# Patient Record
Sex: FEMALE | Race: ASIAN | Employment: UNEMPLOYED | URBAN - METROPOLITAN AREA
[De-identification: names, ages, dates, MRNs, and addresses within clinical notes are randomized per-mention and may not be internally consistent; named-entity substitution may affect disease eponyms.]

---

## 2019-05-29 ENCOUNTER — HOSPITAL ENCOUNTER (INPATIENT)
Age: 74
LOS: 8 days | Discharge: NURSING, REHAB OR LONG-TERM CARE | DRG: 056 | End: 2019-06-06
Attending: EMERGENCY MEDICINE | Admitting: HOSPITALIST
Payer: COMMERCIAL

## 2019-05-29 ENCOUNTER — APPOINTMENT (OUTPATIENT)
Dept: MRI IMAGING | Age: 74
DRG: 056 | End: 2019-05-29
Attending: EMERGENCY MEDICINE
Payer: COMMERCIAL

## 2019-05-29 ENCOUNTER — APPOINTMENT (OUTPATIENT)
Dept: CT IMAGING | Age: 74
DRG: 056 | End: 2019-05-29
Attending: EMERGENCY MEDICINE
Payer: COMMERCIAL

## 2019-05-29 ENCOUNTER — APPOINTMENT (OUTPATIENT)
Dept: CT IMAGING | Age: 74
DRG: 056 | End: 2019-05-29
Attending: HOSPITALIST
Payer: COMMERCIAL

## 2019-05-29 ENCOUNTER — APPOINTMENT (OUTPATIENT)
Dept: NON INVASIVE DIAGNOSTICS | Age: 74
DRG: 056 | End: 2019-05-29
Attending: HOSPITALIST
Payer: COMMERCIAL

## 2019-05-29 DIAGNOSIS — G25.5 HEMIBALLISMUS: Primary | ICD-10-CM

## 2019-05-29 DIAGNOSIS — F03.90 DEMENTIA WITHOUT BEHAVIORAL DISTURBANCE, UNSPECIFIED DEMENTIA TYPE: ICD-10-CM

## 2019-05-29 DIAGNOSIS — R41.0 CONFUSION: ICD-10-CM
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
## 2019-05-29 PROBLEM — Z99.3 WHEELCHAIR BOUND: Status: ACTIVE | Noted: 2019-05-29
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
## 2019-05-29 PROBLEM — I10 HYPERTENSION: Status: ACTIVE | Noted: 2019-05-29

## 2019-05-29 PROBLEM — Z86.73 HISTORY OF STROKE: Status: ACTIVE | Noted: 2019-05-29

## 2019-05-29 LAB
ALBUMIN SERPL-MCNC: 3.6 G/DL (ref 3.4–5)
ALBUMIN/GLOB SERPL: 0.8 {RATIO} (ref 0.8–1.7)
ALP SERPL-CCNC: 85 U/L (ref 45–117)
ALT SERPL-CCNC: 34 U/L (ref 13–56)
AMMONIA PLAS-SCNC: 20 UMOL/L (ref 11–32)
ANION GAP SERPL CALC-SCNC: 8 MMOL/L (ref 3–18)
APPEARANCE UR: CLEAR
APTT PPP: 26.7 SEC (ref 23–36.4)
AST SERPL-CCNC: 27 U/L (ref 15–37)
BACTERIA URNS QL MICRO: ABNORMAL /HPF
BASOPHILS # BLD: 0 K/UL (ref 0–0.1)
BASOPHILS NFR BLD: 0 % (ref 0–2)
BILIRUB SERPL-MCNC: 1 MG/DL (ref 0.2–1)
BILIRUB UR QL: NEGATIVE
BUN SERPL-MCNC: 32 MG/DL (ref 7–18)
BUN/CREAT SERPL: 27 (ref 12–20)
CALCIUM SERPL-MCNC: 8.8 MG/DL (ref 8.5–10.1)
CHLORIDE SERPL-SCNC: 109 MMOL/L (ref 100–108)
CK MB CFR SERPL CALC: 1.3 % (ref 0–4)
CK MB SERPL-MCNC: 3.4 NG/ML (ref 5–25)
CK SERPL-CCNC: 260 U/L (ref 26–192)
CO2 SERPL-SCNC: 26 MMOL/L (ref 21–32)
COLOR UR: YELLOW
CREAT SERPL-MCNC: 1.18 MG/DL (ref 0.6–1.3)
DIFFERENTIAL METHOD BLD: ABNORMAL
EOSINOPHIL # BLD: 1.1 K/UL (ref 0–0.4)
EOSINOPHIL NFR BLD: 12 % (ref 0–5)
EPITH CASTS URNS QL MICRO: ABNORMAL /LPF (ref 0–5)
ERYTHROCYTE [DISTWIDTH] IN BLOOD BY AUTOMATED COUNT: 13.4 % (ref 11.6–14.5)
GLOBULIN SER CALC-MCNC: 4.4 G/DL (ref 2–4)
GLUCOSE BLD STRIP.AUTO-MCNC: 98 MG/DL (ref 70–110)
GLUCOSE SERPL-MCNC: 106 MG/DL (ref 74–99)
GLUCOSE UR STRIP.AUTO-MCNC: NEGATIVE MG/DL
HCT VFR BLD AUTO: 34.8 % (ref 35–45)
HGB BLD-MCNC: 11.2 G/DL (ref 12–16)
HGB UR QL STRIP: NEGATIVE
INR PPP: 1 (ref 0.8–1.2)
KETONES UR QL STRIP.AUTO: NEGATIVE MG/DL
LEUKOCYTE ESTERASE UR QL STRIP.AUTO: NEGATIVE
LYMPHOCYTES # BLD: 1.8 K/UL (ref 0.9–3.6)
LYMPHOCYTES NFR BLD: 20 % (ref 21–52)
MAGNESIUM SERPL-MCNC: 2.3 MG/DL (ref 1.6–2.6)
MCH RBC QN AUTO: 29.7 PG (ref 24–34)
MCHC RBC AUTO-ENTMCNC: 32.2 G/DL (ref 31–37)
MCV RBC AUTO: 92.3 FL (ref 74–97)
MONOCYTES # BLD: 0.6 K/UL (ref 0.05–1.2)
MONOCYTES NFR BLD: 7 % (ref 3–10)
NEUTS SEG # BLD: 5.6 K/UL (ref 1.8–8)
NEUTS SEG NFR BLD: 61 % (ref 40–73)
NITRITE UR QL STRIP.AUTO: NEGATIVE
PH UR STRIP: 5.5 [PH] (ref 5–8)
PLATELET # BLD AUTO: 290 K/UL (ref 135–420)
PMV BLD AUTO: 9.8 FL (ref 9.2–11.8)
POTASSIUM SERPL-SCNC: 3.7 MMOL/L (ref 3.5–5.5)
PROT SERPL-MCNC: 8 G/DL (ref 6.4–8.2)
PROT UR STRIP-MCNC: 100 MG/DL
PROTHROMBIN TIME: 12.6 SEC (ref 11.5–15.2)
RBC # BLD AUTO: 3.77 M/UL (ref 4.2–5.3)
RBC #/AREA URNS HPF: ABNORMAL /HPF (ref 0–5)
SODIUM SERPL-SCNC: 143 MMOL/L (ref 136–145)
SP GR UR REFRACTOMETRY: 1.02 (ref 1–1.03)
TROPONIN I SERPL-MCNC: <0.02 NG/ML (ref 0–0.04)
UROBILINOGEN UR QL STRIP.AUTO: 1 EU/DL (ref 0.2–1)
WBC # BLD AUTO: 9.1 K/UL (ref 4.6–13.2)
WBC URNS QL MICRO: ABNORMAL /HPF (ref 0–5)

## 2019-05-29 PROCEDURE — 74011636320 HC RX REV CODE- 636/320: Performed by: EMERGENCY MEDICINE

## 2019-05-29 PROCEDURE — 77030005563 HC CATH URETH INT MMGH -A

## 2019-05-29 PROCEDURE — 81001 URINALYSIS AUTO W/SCOPE: CPT

## 2019-05-29 PROCEDURE — 93005 ELECTROCARDIOGRAM TRACING: CPT

## 2019-05-29 PROCEDURE — 83735 ASSAY OF MAGNESIUM: CPT

## 2019-05-29 PROCEDURE — 70496 CT ANGIOGRAPHY HEAD: CPT

## 2019-05-29 PROCEDURE — 99285 EMERGENCY DEPT VISIT HI MDM: CPT

## 2019-05-29 PROCEDURE — 82962 GLUCOSE BLOOD TEST: CPT

## 2019-05-29 PROCEDURE — 95816 EEG AWAKE AND DROWSY: CPT | Performed by: HOSPITALIST

## 2019-05-29 PROCEDURE — 82550 ASSAY OF CK (CPK): CPT

## 2019-05-29 PROCEDURE — 85610 PROTHROMBIN TIME: CPT

## 2019-05-29 PROCEDURE — 74011250636 HC RX REV CODE- 250/636: Performed by: EMERGENCY MEDICINE

## 2019-05-29 PROCEDURE — 70551 MRI BRAIN STEM W/O DYE: CPT

## 2019-05-29 PROCEDURE — 65660000000 HC RM CCU STEPDOWN

## 2019-05-29 PROCEDURE — 85025 COMPLETE CBC W/AUTO DIFF WBC: CPT

## 2019-05-29 PROCEDURE — 70450 CT HEAD/BRAIN W/O DYE: CPT

## 2019-05-29 PROCEDURE — 82140 ASSAY OF AMMONIA: CPT

## 2019-05-29 PROCEDURE — 85730 THROMBOPLASTIN TIME PARTIAL: CPT

## 2019-05-29 PROCEDURE — 74011250636 HC RX REV CODE- 250/636: Performed by: HOSPITALIST

## 2019-05-29 PROCEDURE — 80053 COMPREHEN METABOLIC PANEL: CPT

## 2019-05-29 PROCEDURE — 51701 INSERT BLADDER CATHETER: CPT

## 2019-05-29 RX ORDER — LORAZEPAM 2 MG/ML
1 INJECTION INTRAMUSCULAR ONCE
Status: COMPLETED | OUTPATIENT
Start: 2019-05-29 | End: 2019-05-29

## 2019-05-29 RX ORDER — SODIUM CHLORIDE 9 MG/ML
30 INJECTION INTRAMUSCULAR; INTRAVENOUS; SUBCUTANEOUS ONCE
Status: DISPENSED | OUTPATIENT
Start: 2019-05-29 | End: 2019-05-30

## 2019-05-29 RX ORDER — ERGOCALCIFEROL 1.25 MG/1
50000 CAPSULE ORAL
COMMUNITY

## 2019-05-29 RX ORDER — ATORVASTATIN CALCIUM 20 MG/1
40 TABLET, FILM COATED ORAL DAILY
Status: DISCONTINUED | OUTPATIENT
Start: 2019-05-29 | End: 2019-05-30

## 2019-05-29 RX ORDER — ENOXAPARIN SODIUM 100 MG/ML
40 INJECTION SUBCUTANEOUS EVERY 24 HOURS
Status: DISCONTINUED | OUTPATIENT
Start: 2019-05-29 | End: 2019-06-06 | Stop reason: HOSPADM

## 2019-05-29 RX ORDER — SODIUM CHLORIDE 9 MG/ML
INJECTION INTRAMUSCULAR; INTRAVENOUS; SUBCUTANEOUS
Status: DISPENSED
Start: 2019-05-29 | End: 2019-05-30

## 2019-05-29 RX ORDER — SODIUM CHLORIDE 9 MG/ML
75 INJECTION, SOLUTION INTRAVENOUS CONTINUOUS
Status: DISPENSED | OUTPATIENT
Start: 2019-05-29 | End: 2019-05-30

## 2019-05-29 RX ORDER — ACETAMINOPHEN 325 MG/1
650 TABLET ORAL
Status: DISCONTINUED | OUTPATIENT
Start: 2019-05-29 | End: 2019-06-06 | Stop reason: HOSPADM

## 2019-05-29 RX ORDER — SODIUM CHLORIDE 0.9 % (FLUSH) 0.9 %
5-40 SYRINGE (ML) INJECTION EVERY 8 HOURS
Status: DISCONTINUED | OUTPATIENT
Start: 2019-05-29 | End: 2019-06-06 | Stop reason: HOSPADM

## 2019-05-29 RX ORDER — GUAIFENESIN 100 MG/5ML
81 LIQUID (ML) ORAL DAILY
Status: DISCONTINUED | OUTPATIENT
Start: 2019-05-30 | End: 2019-06-06 | Stop reason: HOSPADM

## 2019-05-29 RX ORDER — ONDANSETRON 2 MG/ML
4 INJECTION INTRAMUSCULAR; INTRAVENOUS
Status: DISCONTINUED | OUTPATIENT
Start: 2019-05-29 | End: 2019-06-06 | Stop reason: HOSPADM

## 2019-05-29 RX ORDER — GUAIFENESIN 100 MG/5ML
81 LIQUID (ML) ORAL
Status: DISPENSED | OUTPATIENT
Start: 2019-05-29 | End: 2019-05-30

## 2019-05-29 RX ORDER — ATORVASTATIN CALCIUM 20 MG/1
20 TABLET, FILM COATED ORAL DAILY
COMMUNITY

## 2019-05-29 RX ORDER — SODIUM CHLORIDE 0.9 % (FLUSH) 0.9 %
5-40 SYRINGE (ML) INJECTION AS NEEDED
Status: DISCONTINUED | OUTPATIENT
Start: 2019-05-29 | End: 2019-06-06 | Stop reason: HOSPADM

## 2019-05-29 RX ORDER — ATORVASTATIN CALCIUM 20 MG/1
20 TABLET, FILM COATED ORAL DAILY
Status: DISCONTINUED | OUTPATIENT
Start: 2019-05-30 | End: 2019-06-06 | Stop reason: HOSPADM

## 2019-05-29 RX ORDER — LOSARTAN POTASSIUM 50 MG/1
50 TABLET ORAL DAILY
COMMUNITY

## 2019-05-29 RX ADMIN — ENOXAPARIN SODIUM 40 MG: 40 INJECTION SUBCUTANEOUS at 14:50

## 2019-05-29 RX ADMIN — Medication 10 ML: at 21:19

## 2019-05-29 RX ADMIN — SODIUM CHLORIDE 100 ML/HR: 900 INJECTION, SOLUTION INTRAVENOUS at 15:52

## 2019-05-29 RX ADMIN — Medication 10 ML: at 15:52

## 2019-05-29 RX ADMIN — LORAZEPAM 1 MG: 2 INJECTION, SOLUTION INTRAMUSCULAR; INTRAVENOUS at 13:59

## 2019-05-29 RX ADMIN — IOPAMIDOL 100 ML: 755 INJECTION, SOLUTION INTRAVENOUS at 14:26

## 2019-05-29 NOTE — PROGRESS NOTES
TRANSFER - IN REPORT:    Verbal report received from [place] RN(name) on [place]  being received from ED(unit) for routine progression of care      Report consisted of patients Situation, Background, Assessment and   Recommendations(SBAR). Information from the following report(s) SBAR, Kardex, ED Summary, Intake/Output, MAR, Accordion and Alarm Parameters  was reviewed with the receiving nurse. Opportunity for questions and clarification was provided. Assessment completed upon patients arrival to unit and care assumed. 1545 Patient received 1 mg of Ativan downstairs. Patient extremely drowsy. Attempted to perform neuro assessment, Pt wakes up then falls right back to sleep. 36 Dr. Claire Cloud aware of patient's level of consciousness. Told to keep her NPO until she wakes up. Shift Summary: Shift uneventful. No complaints of chest pain or shortness of breath. Call light is within reach.

## 2019-05-29 NOTE — ED PROVIDER NOTES
EMERGENCY DEPARTMENT HISTORY AND PHYSICAL EXAM    Date: 5/29/2019  Patient Name: Kirk Chowdary    History of Presenting Illness     Chief Complaint   Patient presents with    Altered mental status         History Provided By: Patient's daughter    Additional History (Context):   Kirk Chowdary is a 69 y.o. female with PMHX Hypertension, hyperlipidemia who is a non-English speaker presents to the emergency department with her daughter who is translating with reported altered mental status. Patient is wheelchair-bound secondary to previous right lower extremity injury. Patient's daughter reports that the patient fell from her wheelchair 3 days ago and since that time has had uncontrolled movements of her right upper extremity and appearing more confused. Her daughter reports that she is unsure if the patient hit her head however was found down on the ground. .  Pt denies chest pain, shortness of breath, abdominal pain, headache and any other sxs or complaints. Daughter denies any recent illness. Daughter reports that the patient has been confused which is not her baseline. PCP: None        Past History     Past Medical History:  Past Medical History:   Diagnosis Date    Hyperlipemia     Stroke [facility])        Past Surgical History:  Past Surgical History:   Procedure Laterality Date    HX KNEE ARTHROSCOPY      right hip       Family History:  History reviewed. No pertinent family history. Social History:  Social History     Tobacco Use    Smoking status: Never Smoker    Smokeless tobacco: Never Used   Substance Use Topics    Alcohol use: Not Currently    Drug use: Not on file       Allergies:  No Known Allergies      Review of Systems   Review of Systems   Constitutional: Negative for chills and fever. HENT: Negative for congestion, ear pain, sinus pain and sore throat. Eyes: Negative for pain and visual disturbance. Respiratory: Negative for cough and shortness of breath.     Cardiovascular: Negative for chest pain and leg swelling. Gastrointestinal: Negative for abdominal pain, constipation, diarrhea, nausea and vomiting. Genitourinary: Negative for dysuria, hematuria, vaginal bleeding and vaginal discharge. Musculoskeletal: Negative for back pain and neck pain. Skin: Negative for rash and wound. Neurological: Negative for dizziness, tremors, weakness, light-headedness and numbness. Psychiatric/Behavioral: Positive for confusion. All other systems reviewed and are negative.       Physical Exam     Vitals:    05/29/19 1133 05/29/19 1215 05/29/19 1230 05/29/19 1248   BP: 144/75 129/60 125/57 135/62   Pulse: 90 78 79 79   Resp: 18 19 18 14   Temp: 97.8 °F (36.6 °C)      SpO2: 97% 100% 100% 100%   Weight: 63.5 kg (140 lb)      Height: 5' 2\" (1.575 m)        Physical Exam    Nursing note and vitals reviewed    Constitutional: Elderly  female, awake and alert however appearing confused, able to be redirectable  Head: Normocephalic, Atraumatic  Eyes: Pupils are equal, round, and reactive to light, EOMI  Neck: Supple, non-tender  Cardiovascular: Regular rate and rhythm, no murmurs, rubs, or gallops  Chest: Normal work of breathing and chest excursion bilaterally  Lungs: Clear to ausculation bilaterally, no wheezes, no rhonchi  Abdomen: Soft, non tender, non distended, normoactive bowel sounds  Back: No evidence of trauma or deformity  Extremities: No evidence of trauma or deformity, no LE edema  Skin: Warm and dry, normal cap refill  Neuro: Awake and alert, no facial droop, no slurred speech, right upper extremity with intermittent hemiballismus, right lower extremity with drift, at her baseline, left upper and left lower extremity moving symmetrically and volitionally         Diagnostic Study Results     Labs -     Recent Results (from the past 12 hour(s))   CBC WITH AUTOMATED DIFF    Collection Time: 05/29/19 11:50 AM   Result Value Ref Range    WBC 9.1 4.6 - 13.2 K/uL    RBC 3.77 (L) 4.20 - 5.30 M/uL    HGB 11.2 (L) 12.0 - 16.0 g/dL    HCT 34.8 (L) 35.0 - 45.0 %    MCV 92.3 74.0 - 97.0 FL    MCH 29.7 24.0 - 34.0 PG    MCHC 32.2 31.0 - 37.0 g/dL    RDW 13.4 11.6 - 14.5 %    PLATELET 992 511 - 356 K/uL    MPV 9.8 9.2 - 11.8 FL    NEUTROPHILS 61 40 - 73 %    LYMPHOCYTES 20 (L) 21 - 52 %    MONOCYTES 7 3 - 10 %    EOSINOPHILS 12 (H) 0 - 5 %    BASOPHILS 0 0 - 2 %    ABS. NEUTROPHILS 5.6 1.8 - 8.0 K/UL    ABS. LYMPHOCYTES 1.8 0.9 - 3.6 K/UL    ABS. MONOCYTES 0.6 0.05 - 1.2 K/UL    ABS. EOSINOPHILS 1.1 (H) 0.0 - 0.4 K/UL    ABS. BASOPHILS 0.0 0.0 - 0.1 K/UL    DF AUTOMATED     AMMONIA    Collection Time: 05/29/19 11:50 AM   Result Value Ref Range    Ammonia 20 11 - 32 UMOL/L   METABOLIC PANEL, COMPREHENSIVE    Collection Time: 05/29/19 11:50 AM   Result Value Ref Range    Sodium 143 136 - 145 mmol/L    Potassium 3.7 3.5 - 5.5 mmol/L    Chloride 109 (H) 100 - 108 mmol/L    CO2 26 21 - 32 mmol/L    Anion gap 8 3.0 - 18 mmol/L    Glucose 106 (H) 74 - 99 mg/dL    BUN 32 (H) 7.0 - 18 MG/DL    Creatinine 1.18 0.6 - 1.3 MG/DL    BUN/Creatinine ratio 27 (H) 12 - 20      GFR est AA 55 (L) >60 ml/min/1.73m2    GFR est non-AA 45 (L) >60 ml/min/1.73m2    Calcium 8.8 8.5 - 10.1 MG/DL    Bilirubin, total 1.0 0.2 - 1.0 MG/DL    ALT (SGPT) 34 13 - 56 U/L    AST (SGOT) 27 15 - 37 U/L    Alk.  phosphatase 85 45 - 117 U/L    Protein, total 8.0 6.4 - 8.2 g/dL    Albumin 3.6 3.4 - 5.0 g/dL    Globulin 4.4 (H) 2.0 - 4.0 g/dL    A-G Ratio 0.8 0.8 - 1.7     MAGNESIUM    Collection Time: 05/29/19 11:50 AM   Result Value Ref Range    Magnesium 2.3 1.6 - 2.6 mg/dL   CARDIAC PANEL,(CK, CKMB & TROPONIN)    Collection Time: 05/29/19 11:50 AM   Result Value Ref Range     (H) 26 - 192 U/L    CK - MB 3.4 <3.6 ng/ml    CK-MB Index 1.3 0.0 - 4.0 %    Troponin-I, QT <0.02 0.0 - 0.045 NG/ML   PROTHROMBIN TIME + INR    Collection Time: 05/29/19 11:50 AM   Result Value Ref Range    Prothrombin time 12.6 11.5 - 15.2 sec    INR 1.0 0.8 - 1.2 PTT    Collection Time: 05/29/19 11:50 AM   Result Value Ref Range    aPTT 26.7 23.0 - 36.4 SEC   EKG, 12 LEAD, INITIAL    Collection Time: 05/29/19 11:50 AM   Result Value Ref Range    Ventricular Rate 86 BPM    Atrial Rate 86 BPM    P-R Interval 148 ms    QRS Duration 126 ms    Q-T Interval 412 ms    QTC Calculation (Bezet) 493 ms    Calculated P Axis 45 degrees    Calculated R Axis 36 degrees    Calculated T Axis 35 degrees    Diagnosis       Normal sinus rhythm  Right bundle branch block  Abnormal ECG  No previous ECGs available     URINALYSIS W/ RFLX MICROSCOPIC    Collection Time: 05/29/19 11:57 AM   Result Value Ref Range    Color YELLOW      Appearance CLEAR      Specific gravity 1.025 1.005 - 1.030      pH (UA) 5.5 5.0 - 8.0      Protein 100 (A) NEG mg/dL    Glucose NEGATIVE  NEG mg/dL    Ketone NEGATIVE  NEG mg/dL    Bilirubin NEGATIVE  NEG      Blood NEGATIVE  NEG      Urobilinogen 1.0 0.2 - 1.0 EU/dL    Nitrites NEGATIVE  NEG      Leukocyte Esterase NEGATIVE  NEG         Radiologic Studies -   CT HEAD WO CONT   Final Result   IMPRESSION:         1. No acute intracranial abnormality. 2. Evidence of prior left frontal lobe infarcts with associated ex vacuo   dilatation of the anterior horn of the left lateral ventricle. 3. Subcortical and periventricular white matter low-attenuation; favored to   reflect sequela of chronic ischemic microvascular change. MRI BRAIN WO CONT    (Results Pending)     CT Results  (Last 48 hours)               05/29/19 1215  CT HEAD WO CONT Final result    Impression:  IMPRESSION:           1. No acute intracranial abnormality. 2. Evidence of prior left frontal lobe infarcts with associated ex vacuo   dilatation of the anterior horn of the left lateral ventricle. 3. Subcortical and periventricular white matter low-attenuation; favored to   reflect sequela of chronic ischemic microvascular change.        Narrative:  EXAM: CT head       INDICATION: Unwitnessed fall, increasing agitation. COMPARISON: None. TECHNIQUE: Axial CT imaging of the head was performed without intravenous   contrast.       One or more dose reduction techniques were used on this CT: automated exposure   control, adjustment of the mAs and/or kVp according to patient size, and   iterative reconstruction techniques. The specific techniques used on this CT   exam have been documented in the patient's electronic medical record. Digital   Imaging and Communications in Medicine (DICOM) format image data are available   to nonaffiliated external healthcare facilities or entities on a secure, media   free, reciprocally searchable basis with patient authorization for at least a   12-month period after this study. _______________       FINDINGS:       BRAIN AND POSTERIOR FOSSA: There is mild cortical and cerebellar volume loss   present. There is ex vacuo dilatation of the left lateral ventricle related to   adjacent areas of encephalomalacia within the left frontal lobe and left centrum   semiovale basilar cisterns are patent. Subcortical and periventricular white   matter low-attenuation is present. Bilateral physiologic basal ganglia   calcifications. There is no intracranial hemorrhage, mass effect, or midline   shift. Gray-white matter differentiation is within normal limits. EXTRA-AXIAL SPACES AND MENINGES: There are no abnormal extra-axial fluid   collections. CALVARIUM: Intact. SINUSES: Imaged paranasal sinuses and mastoid air cells are clear. OTHER: None.       _______________               CXR Results  (Last 48 hours)    None            Medical Decision Making   I am the first provider for this patient. I reviewed the vital signs, available nursing notes, past medical history, past surgical history, family history and social history. Vital Signs-Reviewed the patient's vital signs.     Pulse Oximetry Analysis -97 % on room air    Cardiac Monitor:  Rate: 90 bpm  Rhythm: Regular    EKG interpretation: (Preliminary)  11:53 AM   Normal sinus rhythm at 86 bpm.  QTc 493 ms. Right bundle branch block, no prior EKGs to compare. No acute ST elevation    Records Reviewed: Nursing Notes and Old Medical Records    Provider Notes:   69 y.o. female with a history of hypertension, hyperlipidemia presenting with uncontrolled movements of her right upper extremity and confusion after a fall 4 days ago. Presentation patient is awake, alert. Afebrile with appropriate vital signs. Although she is redirectable she does appear confused. No slurred speech and no facial droop. Has uncontrolled hemiballismus of her right upper extremity. Concern for acute bleed versus stroke. However patient symptoms occurred 4 days ago, not within the TPA window. No indication for stroke alert. Will obtain labs to evaluate for her altered mental status including ammonia, UA. Procedures:  Procedures    ED Course:   11:33 AM   Initial assessment performed. The patients presenting problems have been discussed, and they are in agreement with the care plan formulated and outlined with them. I have encouraged them to ask questions as they arise throughout their visit. ED Course as of May 29 1323   Wed May 29, 2019   1300 1:18 PM CT scan showing no acute intracranial hemorrhage. Old left frontal infarct. Discussed patient's history, exam, and available diagnostics results with Teresita Do MD neurology, who agree with MRI, admission for EEG. [CA]      ED Course User Index  [CA] Tucker Montenegro,        Diagnosis and Disposition     1:21 PM  I have spent 35 minutes of critical care time involved in lab review, consultations with specialist, family decision-making, and documentation. During this entire length of time I was immediately available to the patient. Critical Care:   The reason for providing this level of medical care for this critically ill patient was due a critical illness that impaired one or more vital organ systems such that there was a high probability of imminent or life threatening deterioration in the patients condition. This care involved high complexity decision making to assess, manipulate, and support vital system functions, to treat this degreee vital organ system failure and to prevent further life threatening deterioration of the patients condition. Core Measures:  For Hospitalized Patients:    1. Hospitalization Decision Time:  The decision to hospitalize the patient was made by Anjana Montenegro DO at 1:22 PM on 5/29/2019    2. Aspirin: Aspirin was given    1:21 PM  Patient is being admitted to the hospital by Emil Chinchilla MD. The results of their tests and reasons for their admission have been discussed with them and/or available family. They convey agreement and understanding for the need to be admitted and for their admission diagnosis. CONDITIONS ON ADMISSION:  Sepsis is not present at the time of admission. Urinary Tract Infection is not present at the time of admission. MRSA is not present at the time of admission. Wound infection is not present at the time of admission. Pressure Ulcer is not present at the time of admission. CLINICAL IMPRESSION:    1. Hemiballismus    2. Confusion      ____________________________________     Please note that this dictation was completed with [employer], the computer voice recognition software. Quite often unanticipated grammatical, syntax, homophones, and other interpretive errors are inadvertently transcribed by the computer software. Please disregard these errors. Please excuse any errors that have escaped final proofreading.

## 2019-05-29 NOTE — PROGRESS NOTES
1930 - Bedside and Verbal shift change report given to Angelo Lorenzo RN and Edie Quispe RN (oncoming nurse) by Pepe Negrete RN (offgoing nurse).  Report included the following information SBAR, Kardex, ED Summary, Intake/Output, Recent Results and Cardiac Rhythm SR. NS at 100ml/hr

## 2019-05-29 NOTE — ED NOTES
TRANSFER - OUT REPORT:    Verbal report given to Nicolette(name) calderon Miller Born  being transferred to [facility](unit) for routine progression of care       Report consisted of patients Situation, Background, Assessment and   Recommendations(SBAR). Information from the following report(s) SBAR, Kardex, ED Summary, Intake/Output, MAR, Recent Results and Cardiac Rhythm NSR  was reviewed with the receiving nurse. Lines:   Peripheral IV 05/29/19 Left Antecubital (Active)   Site Assessment Clean, dry, & intact 5/29/2019 12:00 PM   Phlebitis Assessment 0 5/29/2019 12:00 PM   Infiltration Assessment 0 5/29/2019 12:00 PM   Dressing Status Clean, dry, & intact 5/29/2019 12:00 PM   Dressing Type Transparent;Tape 5/29/2019 12:00 PM   Hub Color/Line Status Pink 5/29/2019 12:00 PM   Action Taken Blood drawn 5/29/2019 12:00 PM   Alcohol Cap Used Yes 5/29/2019 12:00 PM        Opportunity for questions and clarification was provided.       Patient transported with:   Monitor  Registered Nurse

## 2019-05-29 NOTE — PROGRESS NOTES
Bedside shift change report given to Toney Candelaria RN (oncoming nurse) by Lindsey Dougherty RN (offgoing nurse). Report included the following information SBAR, Kardex, ED Summary, Intake/Output, MAR, Accordion, Recent Results and Alarm Parameters .

## 2019-05-29 NOTE — ED NOTES
RN in room for purpose of hourly rounding. Room checked for trash and safety hazards. Patient is. ..    [  ] seated at bedside. [  ] lying in bed with side rails up and call bell in reach. [ x ] lying in with call bell in reach and continuous monitoring in place. Pain reduction interventions. ..    [  ] not indicated at this time      include the following   [  ] administration of analgesic medications   [  ] lights turned down   [  ] patient offered a cool washcloth   [  ] heat applied   [  ] ice applied   [ x ] patient repositioned    Restroom needs addressed. Patient is. ... [ x ] Not in need restroom assistance at this time  [  ] Ambulatory as needed to the restroom  [  ] Assisted to bedside commode  [  ] Assisted with use of bed pan  [  ] Provided with a urinal    Position. ..    [  ] Patient does not require assistance with repositioning  [ x ] Patient repositions with assistance of RN  [  ] Patient repositioned with assistance of RN and other ED staff.

## 2019-05-29 NOTE — ED TRIAGE NOTES
Patient had unwitnessed fall on Sunday per daughter, patient's daughter states that she started acting different yesterday, patient is alert, patient has repetitive   right arm movement that is not normal per patient's daughter, patient in wheelchair at baseline per daughter

## 2019-05-29 NOTE — H&P
[facility] [place]  HISTORY AND PHYSICAL    Name:  Fernando Encinas  MR#:   [id number]  :  1945  ACCOUNT #:  [de-identified]  ADMIT DATE:  2019    ADMITTING DIAGNOSES:  1. Fall at home. 2.  Hemiballismus. 3.  Possible stroke. 4.  History of stroke. 5.  Hypertension. 6.  Wheelchair dependence due to prior hip injury. HISTORY OF PRESENT ILLNESS:  This is a 79-year-old [place] female with a past medical history of high blood pressure and hyperlipidemia who does not speak Ollis fabroomskler. She came into the emergency room with her daughter because she had fallen from her wheelchair about 3 days ago. The day after the fall, the daughter noted that she could not feed herself as usual and had uncontrolled movement of her right arm and was also more confused. Her daughter thought it may clear up but she did not, and with that, she brought her into the ER today. She apparently had hit her head per the patient's report to her daughter when she fell from the wheelchair, but her daughter was not present when the fall happened. She found her down on the ground. There was no noted loss of consciousness or incontinence. She does not have a history of seizures. In the emergency room, she was sent for MRI and CTA of the head and neck. Neurology has been consulted from the ER, and I visited with her daughter in the CT area and examined the patient. Unfortunately, the patient is unable to provide any history or participate in her exam as she has been given Ativan to have cooperation for the MRI. PAST MEDICAL HISTORY:  Hyperlipidemia, hypertension, stroke, left hip surgery, left heel ulcer that previously required a wound VAC. PAST SURGICAL HISTORY:  Other surgeries to include heel surgery, knee surgery. ALLERGIES:  SHE HAS NO MEDICATION ALLERGIES. CURRENT MEDICATIONS:  At home, she is on Cozaar, vitamin D, and Lipitor. SOCIAL HISTORY:  Nonsmoker. No alcohol or drug use.   She lives with her daughter. She is wheelchair dependent. FAMILY HISTORY:  High blood pressure. Her PCP is in [place], name is not listed. REVIEW OF SYSTEMS:  Not able to be obtained from the patient currently because of sedation from Ativan. From what the daughter notes to me  CONSTITUTIONAL:  There has been no fevers noted at home. RESPIRATORY:  No shortness of breath or coughing. CARDIOVASCULAR:  She has had no complaints of chest pain or leg swelling. GASTROINTESTINAL:  She has had no change in her appetite but inability to feed herself. No abdominal pain, nausea, vomiting, or diarrhea. GENITOURINARY:  No urinary incontinence. NEUROLOGIC:  She has had spastic movements of the right arm, uncontrolled movements of the right arm, inability to coordinate the right arm, inability to feed herself, and more confused compared baseline. PHYSICAL EXAMINATION:  GENERAL:  This is a currently sedated  female who appears elderly. VITAL SIGNS:  Her blood pressure is 126/61, pulse 78, temperature 97.7, respiratory rate is 15, and SaO2 is 99%. She is again sedated from Ativan. CARDIOVASCULAR:  Regular rate and rhythm. No murmur, rub, or gallop. CHEST:  Normal work of breathing. Bilaterally clear. ABDOMEN:  Soft, nontender. No distention. Normoactive bowel sounds. LOWER EXTREMITIES:  There is no clubbing, cyanosis, or edema. SKIN:  Appears warm, dry. No lesions or rash noted. NEUROLOGIC:  She is sedated. She is not following commands currently. From the emergency room, there was noted intermittent hemiballismus with right lower extremity drift, and the left upper extremity and lower extremity had been moving symmetrically and volitionally there per their report. LABORATORY DATA:  A CT of her head was done. It showed no acute intracranial abnormality but there is evidence of prior left frontal lobe infarcts with ex vacuo dilatation of the anterior horn of the left lateral ventricle.   She had various labs done in the emergency room which included an EKG that showed right bundle-branch block, normal sinus rhythm. Urinalysis with 1-3 wbc's, 2+ bacteria. A CTA head and neck has been performed. The results are pending. MRI of the brain has been performed, results are pending. White count is 9.1, H and H 11.2 and 34.8, platelets are 047. INR is 1.0, glucose 106. ASSESSMENT:  1. Fall at home from the wheelchair. 2.  Right hemiballismus. 3.  Possible stroke. 4.  History of stroke. 5.  Hypertension. 6.  History of left hip surgery leaving her wheelchair dependent. 7.  Nonambulatory status. PLAN:  Admission to the telemetry unit. IV fluid hydration and liberal hypertension. Follow up the results of the MRI of the brain and CTA of the head and neck as well as an echocardiogram.  Tomorrow's labs, CBC, lipid panel, CMP. Consult with Neurology. Continue fluid hydration. Continue aspirin daily, Lovenox for DVT prophylaxis. Hold her blood pressure medicine for now and see what things look like. I am also planning an EEG for her to make sure that this is not spastic-type movement from seizures. She will need a bedside swallow evaluation to start on a cardiac diet. We are going to follow her blood sugars a.c. and at bedtime. Consult with PT and OT. I have discussed the plan of care with her daughter who is at the bedside. I am expecting 48-72 hours in the hospital with possible SNF placement after discharge from here.       Valery Llanes MD      [id number]  D:  05/29/2019 15:11  T:  05/29/2019 15:18  JOB #:  [id number]

## 2019-05-29 NOTE — PROGRESS NOTES
Problem: Pressure Injury - Risk of  Goal: *Prevention of pressure injury  Description  Document Theodore Scale and appropriate interventions in the flowsheet.   Outcome: Progressing Towards Goal     Problem: Patient Education: Go to Patient Education Activity  Goal: Patient/Family Education  Outcome: Progressing Towards Goal

## 2019-05-30 ENCOUNTER — APPOINTMENT (OUTPATIENT)
Dept: NON INVASIVE DIAGNOSTICS | Age: 74
DRG: 056 | End: 2019-05-30
Attending: HOSPITALIST
Payer: COMMERCIAL

## 2019-05-30 LAB
ALBUMIN SERPL-MCNC: 3.2 G/DL (ref 3.4–5)
ALBUMIN/GLOB SERPL: 0.8 {RATIO} (ref 0.8–1.7)
ALP SERPL-CCNC: 75 U/L (ref 45–117)
ALT SERPL-CCNC: 29 U/L (ref 13–56)
ANION GAP SERPL CALC-SCNC: 9 MMOL/L (ref 3–18)
AST SERPL-CCNC: 24 U/L (ref 15–37)
ATRIAL RATE: 86 BPM
BILIRUB SERPL-MCNC: 0.8 MG/DL (ref 0.2–1)
BUN SERPL-MCNC: 23 MG/DL (ref 7–18)
BUN/CREAT SERPL: 22 (ref 12–20)
CALCIUM SERPL-MCNC: 8.4 MG/DL (ref 8.5–10.1)
CALCULATED P AXIS, ECG09: 45 DEGREES
CALCULATED R AXIS, ECG10: 36 DEGREES
CALCULATED T AXIS, ECG11: 35 DEGREES
CHLORIDE SERPL-SCNC: 111 MMOL/L (ref 100–108)
CHOLEST SERPL-MCNC: 111 MG/DL
CO2 SERPL-SCNC: 25 MMOL/L (ref 21–32)
CREAT SERPL-MCNC: 1.04 MG/DL (ref 0.6–1.3)
DIAGNOSIS, 93000: NORMAL
ECHO AO ASC DIAM: 2.41 CM
ECHO AO ROOT DIAM: 3.08 CM
ECHO AV AREA PEAK VELOCITY: 2.4 CM2
ECHO AV AREA VTI: 2.7 CM2
ECHO AV AREA/BSA PEAK VELOCITY: 1.5 CM2/M2
ECHO AV AREA/BSA VTI: 1.6 CM2/M2
ECHO AV MEAN GRADIENT: 6.8 MMHG
ECHO AV PEAK GRADIENT: 12.9 MMHG
ECHO AV PEAK VELOCITY: 179.52 CM/S
ECHO AV VTI: 39.46 CM
ECHO IVC PROX: 0.57 CM
ECHO LA MAJOR AXIS: 2.59 CM
ECHO LA VOL 2C: 33.71 ML (ref 22–52)
ECHO LA VOL 4C: 42.53 ML (ref 22–52)
ECHO LA VOL BP: 41.28 ML (ref 22–52)
ECHO LA VOL/BSA BIPLANE: 25.13 ML/M2 (ref 16–28)
ECHO LA VOLUME INDEX A2C: 20.52 ML/M2 (ref 16–28)
ECHO LA VOLUME INDEX A4C: 25.89 ML/M2 (ref 16–28)
ECHO LV E' LATERAL VELOCITY: 7 CM/S
ECHO LV E' SEPTAL VELOCITY: 5 CM/S
ECHO LV EDV A2C: 66.1 ML
ECHO LV EDV A4C: 46.1 ML
ECHO LV EDV BP: 57.4 ML (ref 56–104)
ECHO LV EDV INDEX A4C: 28.1 ML/M2
ECHO LV EDV INDEX BP: 34.9 ML/M2
ECHO LV EDV NDEX A2C: 40.2 ML/M2
ECHO LV EJECTION FRACTION A2C: 61 %
ECHO LV EJECTION FRACTION A4C: 59 %
ECHO LV EJECTION FRACTION BIPLANE: 58.7 % (ref 55–100)
ECHO LV ESV A2C: 25.9 ML
ECHO LV ESV A4C: 19 ML
ECHO LV ESV BP: 23.7 ML (ref 19–49)
ECHO LV ESV INDEX A2C: 15.8 ML/M2
ECHO LV ESV INDEX A4C: 11.6 ML/M2
ECHO LV ESV INDEX BP: 14.4 ML/M2
ECHO LV INTERNAL DIMENSION DIASTOLIC: 4.11 CM (ref 3.9–5.3)
ECHO LV INTERNAL DIMENSION SYSTOLIC: 2.83 CM
ECHO LV IVSD: 1.49 CM (ref 0.6–0.9)
ECHO LV MASS 2D: 209.5 G (ref 67–162)
ECHO LV MASS INDEX 2D: 127.5 G/M2 (ref 43–95)
ECHO LV POSTERIOR WALL DIASTOLIC: 0.97 CM (ref 0.6–0.9)
ECHO LVOT DIAM: 1.97 CM
ECHO LVOT PEAK GRADIENT: 8.3 MMHG
ECHO LVOT PEAK VELOCITY: 143.87 CM/S
ECHO LVOT VTI: 34.65 CM
ECHO MV A VELOCITY: 93.93 CM/S
ECHO MV AREA PHT: 3 CM2
ECHO MV E DECELERATION TIME (DT): 257 MS
ECHO MV E VELOCITY: 84.22 CM/S
ECHO MV E/A RATIO: 0.9
ECHO MV E/E' LATERAL: 12.03
ECHO MV E/E' RATIO (AVERAGED): 14.44
ECHO MV E/E' SEPTAL: 16.84
ECHO MV PRESSURE HALF TIME (PHT): 74.5 MS
ECHO PULMONARY ARTERY SYSTOLIC PRESSURE (PASP): 26 MMHG
ECHO PV MAX VELOCITY: 99.31 CM/S
ECHO PV PEAK GRADIENT: 3.9 MMHG
ECHO RV INTERNAL DIMENSION: 3.29 CM
ECHO TRICUSPID ANNULAR PEAK SYSTOLIC VELOCITY: 1.5 CM/S
ECHO TV REGURGITANT MAX VELOCITY: 232.17 CM/S
ECHO TV REGURGITANT PEAK GRADIENT: 21.6 MMHG
ERYTHROCYTE [DISTWIDTH] IN BLOOD BY AUTOMATED COUNT: 13.4 % (ref 11.6–14.5)
EST. AVERAGE GLUCOSE BLD GHB EST-MCNC: 114 MG/DL
GLOBULIN SER CALC-MCNC: 3.8 G/DL (ref 2–4)
GLUCOSE BLD STRIP.AUTO-MCNC: 106 MG/DL (ref 70–110)
GLUCOSE BLD STRIP.AUTO-MCNC: 114 MG/DL (ref 70–110)
GLUCOSE BLD STRIP.AUTO-MCNC: 87 MG/DL (ref 70–110)
GLUCOSE SERPL-MCNC: 89 MG/DL (ref 74–99)
HBA1C MFR BLD: 5.6 % (ref 4.2–5.6)
HCT VFR BLD AUTO: 33.5 % (ref 35–45)
HDLC SERPL-MCNC: 37 MG/DL (ref 40–60)
HDLC SERPL: 3 {RATIO} (ref 0–5)
HGB BLD-MCNC: 10.5 G/DL (ref 12–16)
LDLC SERPL CALC-MCNC: 50.4 MG/DL (ref 0–100)
LIPID PROFILE,FLP: ABNORMAL
MCH RBC QN AUTO: 29.4 PG (ref 24–34)
MCHC RBC AUTO-ENTMCNC: 31.3 G/DL (ref 31–37)
MCV RBC AUTO: 93.8 FL (ref 74–97)
P-R INTERVAL, ECG05: 148 MS
PLATELET # BLD AUTO: 271 K/UL (ref 135–420)
PMV BLD AUTO: 10 FL (ref 9.2–11.8)
POTASSIUM SERPL-SCNC: 3.8 MMOL/L (ref 3.5–5.5)
PROT SERPL-MCNC: 7 G/DL (ref 6.4–8.2)
Q-T INTERVAL, ECG07: 412 MS
QRS DURATION, ECG06: 126 MS
QTC CALCULATION (BEZET), ECG08: 493 MS
RBC # BLD AUTO: 3.57 M/UL (ref 4.2–5.3)
SODIUM SERPL-SCNC: 145 MMOL/L (ref 136–145)
TRIGL SERPL-MCNC: 118 MG/DL (ref ?–150)
VENTRICULAR RATE, ECG03: 86 BPM
VLDLC SERPL CALC-MCNC: 23.6 MG/DL
WBC # BLD AUTO: 8.5 K/UL (ref 4.6–13.2)

## 2019-05-30 PROCEDURE — 74011250637 HC RX REV CODE- 250/637: Performed by: HOSPITALIST

## 2019-05-30 PROCEDURE — 93306 TTE W/DOPPLER COMPLETE: CPT

## 2019-05-30 PROCEDURE — 85027 COMPLETE CBC AUTOMATED: CPT

## 2019-05-30 PROCEDURE — 92610 EVALUATE SWALLOWING FUNCTION: CPT

## 2019-05-30 PROCEDURE — 92526 ORAL FUNCTION THERAPY: CPT

## 2019-05-30 PROCEDURE — 74011250637 HC RX REV CODE- 250/637: Performed by: FAMILY MEDICINE

## 2019-05-30 PROCEDURE — 74011250637 HC RX REV CODE- 250/637

## 2019-05-30 PROCEDURE — 36415 COLL VENOUS BLD VENIPUNCTURE: CPT

## 2019-05-30 PROCEDURE — 74011000250 HC RX REV CODE- 250: Performed by: HOSPITALIST

## 2019-05-30 PROCEDURE — 65660000000 HC RM CCU STEPDOWN

## 2019-05-30 PROCEDURE — 74011000250 HC RX REV CODE- 250: Performed by: PSYCHIATRY & NEUROLOGY

## 2019-05-30 PROCEDURE — 82962 GLUCOSE BLOOD TEST: CPT

## 2019-05-30 PROCEDURE — 83036 HEMOGLOBIN GLYCOSYLATED A1C: CPT

## 2019-05-30 PROCEDURE — 74011250636 HC RX REV CODE- 250/636: Performed by: PSYCHIATRY & NEUROLOGY

## 2019-05-30 PROCEDURE — 80061 LIPID PANEL: CPT

## 2019-05-30 PROCEDURE — 74011250636 HC RX REV CODE- 250/636

## 2019-05-30 PROCEDURE — 74011250636 HC RX REV CODE- 250/636: Performed by: HOSPITALIST

## 2019-05-30 PROCEDURE — 74011000258 HC RX REV CODE- 258: Performed by: PSYCHIATRY & NEUROLOGY

## 2019-05-30 PROCEDURE — 80053 COMPREHEN METABOLIC PANEL: CPT

## 2019-05-30 RX ORDER — HALOPERIDOL 5 MG/ML
3 INJECTION INTRAMUSCULAR ONCE
Status: COMPLETED | OUTPATIENT
Start: 2019-05-30 | End: 2019-05-30

## 2019-05-30 RX ORDER — SODIUM CHLORIDE 9 MG/ML
30 INJECTION INTRAMUSCULAR; INTRAVENOUS; SUBCUTANEOUS ONCE
Status: COMPLETED | OUTPATIENT
Start: 2019-05-30 | End: 2019-05-30

## 2019-05-30 RX ORDER — LORAZEPAM 2 MG/ML
INJECTION INTRAMUSCULAR
Status: DISPENSED
Start: 2019-05-30 | End: 2019-05-30

## 2019-05-30 RX ORDER — CHOLECALCIFEROL (VITAMIN D3) 125 MCG
CAPSULE ORAL
Status: COMPLETED
Start: 2019-05-30 | End: 2019-05-30

## 2019-05-30 RX ORDER — WATER FOR INJECTION,STERILE
VIAL (ML) INJECTION
Status: DISPENSED
Start: 2019-05-30 | End: 2019-05-30

## 2019-05-30 RX ORDER — LORAZEPAM 2 MG/ML
1 INJECTION INTRAMUSCULAR ONCE
Status: COMPLETED | OUTPATIENT
Start: 2019-05-30 | End: 2019-05-30

## 2019-05-30 RX ORDER — HALOPERIDOL 5 MG/ML
INJECTION INTRAMUSCULAR
Status: COMPLETED
Start: 2019-05-30 | End: 2019-05-30

## 2019-05-30 RX ORDER — CHOLECALCIFEROL (VITAMIN D3) 125 MCG
5 CAPSULE ORAL
Status: DISCONTINUED | OUTPATIENT
Start: 2019-05-30 | End: 2019-06-06 | Stop reason: HOSPADM

## 2019-05-30 RX ORDER — CEFTRIAXONE 1 G/1
INJECTION, POWDER, FOR SOLUTION INTRAMUSCULAR; INTRAVENOUS
Status: DISPENSED
Start: 2019-05-30 | End: 2019-05-30

## 2019-05-30 RX ADMIN — SODIUM CHLORIDE 30 ML: 9 INJECTION, SOLUTION INTRAMUSCULAR; INTRAVENOUS; SUBCUTANEOUS at 13:55

## 2019-05-30 RX ADMIN — Medication 10 ML: at 05:43

## 2019-05-30 RX ADMIN — Medication: at 03:00

## 2019-05-30 RX ADMIN — HALOPERIDOL LACTATE 3 MG: 5 INJECTION INTRAMUSCULAR at 12:14

## 2019-05-30 RX ADMIN — VALPROATE SODIUM 500 MG: 100 INJECTION, SOLUTION INTRAVENOUS at 23:01

## 2019-05-30 RX ADMIN — VALPROATE SODIUM 500 MG: 100 INJECTION, SOLUTION INTRAVENOUS at 09:25

## 2019-05-30 RX ADMIN — Medication 10 ML: at 23:03

## 2019-05-30 RX ADMIN — LORAZEPAM 1 MG: 2 INJECTION INTRAMUSCULAR; INTRAVENOUS at 10:01

## 2019-05-30 RX ADMIN — ATORVASTATIN CALCIUM 20 MG: 20 TABLET, FILM COATED ORAL at 08:49

## 2019-05-30 RX ADMIN — Medication 5 MG: at 23:01

## 2019-05-30 RX ADMIN — HALOPERIDOL 3 MG: 5 INJECTION INTRAMUSCULAR at 12:14

## 2019-05-30 RX ADMIN — Medication 5 MG: at 02:35

## 2019-05-30 RX ADMIN — ASPIRIN 81 MG 81 MG: 81 TABLET ORAL at 08:44

## 2019-05-30 RX ADMIN — Medication 10 ML: at 14:33

## 2019-05-30 RX ADMIN — ENOXAPARIN SODIUM 40 MG: 40 INJECTION SUBCUTANEOUS at 14:33

## 2019-05-30 RX ADMIN — CEFTRIAXONE 1 G: 1 INJECTION, POWDER, FOR SOLUTION INTRAMUSCULAR; INTRAVENOUS at 12:18

## 2019-05-30 NOTE — PROGRESS NOTES
OT eval orders received and chart reviewed. Attempt for OT eval at 1115 with PT. Pt presents to be confused and oriented to self only. Unable to follow commands in [address] Attempt to assist pt for OOB mobility and bed mobility. Pt requires total assist x2 for the same.  Confirmed with Dr. Loya Sender and agreed to d/c current OT orders until pt is more oriented and safe to participate with activity/   Thank you for the referral.     Gisselle Gambino, OTR/L

## 2019-05-30 NOTE — PROGRESS NOTES
Hospitalist Progress Note    Patient: Leonid Armstrong MRN: [id number]  CSN: [id number]    YOB: 1945  Age: 69 y.o. Sex: female    DOA: 5/29/2019 LOS:  LOS: 1 day          Chief Complaint:    Fall and right arm abnormality      Assessment/Plan     68 yo  female with hx of CVA and wheelchair dependence  . she had a Fall at home. Right arm abnormal movements  Encephalopathy/confusion, unclear significance  Possible UTI-start rocephin for 3 days empirically-bact on UA, but no leuk  History of stroke, extensive left sided disease  Hypertension. Wheelchair dependence due to prior hip injury. Echo pending    MRI brain neg for acute CVA but extensive prior chronic infarcts noted  Possible seizures-valproic acid IV per neuro for now    EEG today  Further plans pending results of above  PT consult    Discussed with daughter  Continue to monitor in hospital           Disposition :snf  Patient Active Problem List   Diagnosis Code    Hemiballismus G25.5    Confusion R41.0    Hypertension I10    Wheelchair bound Z99.3    Fall at home, sequela W19. Reyes Davalos, Y92.009    History of stroke Z86.73       Subjective:    She is confused making random noises, was pulling at her IV last night  Daughter at bedside  She makes rhythmic movements with her right arm, and appears mildly agitated  Daughter confirms she is confused      Review of systems:    Not able to give ROS      Vital signs/Intake and Output:  Visit Vitals  /73   Pulse 83   Temp 97.5 °F (36.4 °C)   Resp 20   Ht 5' 2\" (1.575 m)   Wt 68.3 kg (150 lb 9.2 oz)   SpO2 (!) 89%   BMI 27.54 kg/m²     Current Shift:  No intake/output data recorded.   Last three shifts:  05/28 1901 - 05/30 0700  In: -   Out: 250 [Urine:250]    Exam:    General: elderly confused  female  CVS:Regular rate and rhythm, no M/R/G, S1/S2 heard, no thrill  Lungs:Clear to auscultation bilaterally, no wheezes, rhonchi, or rales  Abdomen: Soft, Nontender, No distention, Normal Bowel sounds, No hepatomegaly  Extremities: No C/C/E, pulses palpable 2+  Skin:normal texture and turgor, no rashes, no lesions  Neuro:does not follows commands, grasps with right hand then throws arm in air and moves in circular motion                  Labs: Results:       Chemistry Recent Labs     05/30/19  0555 05/29/19  1150   GLU 89 106*    143   K 3.8 3.7   * 109*   CO2 25 26   BUN 23* 32*   CREA 1.04 1.18   CA 8.4* 8.8   AGAP 9 8   BUCR 22* 27*   AP 75 85   TP 7.0 8.0   ALB 3.2* 3.6   GLOB 3.8 4.4*   AGRAT 0.8 0.8      CBC w/Diff Recent Labs     05/30/19  0555 05/29/19  1150   WBC 8.5 9.1   RBC 3.57* 3.77*   HGB 10.5* 11.2*   HCT 33.5* 34.8*    290   GRANS  --  61   LYMPH  --  20*   EOS  --  12*      Cardiac Enzymes Recent Labs     05/29/19  1150   *   CKND1 1.3      Coagulation Recent Labs     05/29/19  1150   PTP 12.6   INR 1.0   APTT 26.7       Lipid Panel Lab Results   Component Value Date/Time    Cholesterol, total 111 05/30/2019 05:55 AM    HDL Cholesterol 37 (L) 05/30/2019 05:55 AM    LDL, calculated 50.4 05/30/2019 05:55 AM    VLDL, calculated 23.6 05/30/2019 05:55 AM    Triglyceride 118 05/30/2019 05:55 AM    CHOL/HDL Ratio 3.0 05/30/2019 05:55 AM      BNP No results for input(s): BNPP in the last 72 hours.    Liver Enzymes Recent Labs     05/30/19  0555   TP 7.0   ALB 3.2*   AP 75   SGOT 24      Thyroid Studies No results found for: T4, T3U, TSH, TSHEXT     Procedures/imaging: see electronic medical records for all procedures/Xrays and details which were not copied into this note but were reviewed prior to creation of Sophy Romero MD

## 2019-05-30 NOTE — PROGRESS NOTES
Reason for Admission:    Rob Pinon is a 69 y.o. female with PMHX Hypertension, hyperlipidemia who is a non-English speaker presents to the emergency department with her daughter who is translating with reported altered mental status. Patient is wheelchair-bound secondary to previous right lower extremity injury. Patient's daughter reports that the patient fell from her wheelchair 3 days ago and since that time has had uncontrolled movements of her right upper extremity and appearing more confused. Her daughter reports that she is unsure if the patient hit her head however was found down on the ground. .  Pt denies chest pain, shortness of breath, abdominal pain, headache and any other sxs or complaints. Daughter denies any recent illness. Daughter reports that the patient has been confused                    RRAT Score:     0                Plan for utilizing home health:     tbd                    Current Advanced Directive/Advance Care Plan:  Please consider consulting palliative care or pastoral care to address ACP    Likelihood of Readmission:  low                         Transition of Care Plan:   Met with  Patient at bedside. She wants her daughter to translate for her. Daughter is Gladis Brought [phone]. Daughter is present. Daughter reports patient has moved here about 4-5 years ago. Daughter reports she is now confused. Patient does not speak [place] Reason she speaks Upper sorbian. Pt. Has been confused per daughter and she cannot take care of her like this. Patient has a RW and a wheel chair per daughter. She does not have any income. Daughter reports she is trying to pay for her insurance but not always able to. Daughter would like to see if she would  Qualify for rehab when d/c. Informed her that n order to do this she would have to have pt and ot consult and depending on the recommendations. Cm will submit to rehabs as requested in area.  Informed her that she would have to have an accepting facility and then they would apply for authorization through insurance. Daughter states she cannot afford to pay out of pocket for rehab. Daughter reports she needs some assistance with patient she has no income and no means to pay for anything. Cm has left message for Nirav Olivarez to contact daughter left her a message and daughters  Phone number to see if she qualifies for medicaid.  Cm will continue to follow  Care Management Interventions  Transition of Care Consult (CM Consult): Discharge Planning

## 2019-05-30 NOTE — ADT AUTH CERT NOTES
Neurology [address] Day 1 (5/29/2019) by Erika Pandey RN         Review Status Review Entered   Completed 5/30/2019 13:10       Criteria Review      Care Day: 1 Care Date: 5/29/2019 Level of Care:    Guideline Day 1    Clinical Status   (X) * Clinical Indications met[F]    5/30/2019 13:10:20 EDT by Sia Akbar    Increased confusion  Uncontrolled movement of right uppper ext-? new onset seizures  CT Scan/MRI Brain showing chronic infarct multiple          Interventions   (X) Inpatient interventions as needed    5/30/2019 13:10:20 EDT by Sia Akbar    NS 75cc/hr  Neurologic testing  Dysphagia screening  Neuro checks continuous  PT eval and tx  Sitter  SLP eval and tx          5/30/2019 13:10:20 EDT by Sia Akbar   Subject: Additional Clinical Information      5/29/19 Inpatient admission for Hemiballismus and confusion    VS 97.8, 90, 19, 129/60, 100% RA    ED Tx Ativan ivp x1, Lovenox sc    Family Practice H&P       HISTORY OF PRESENT ILLNESS:   This is a 79-year-old [place] female with a past medical history of high blood pressure and hyperlipidemia who does not speak [place]. She came into the emergency room with her daughter because she had fallen from her wheelchair about 3 days ago. The day after the fall, the daughter noted that she could not feed herself as usual and had uncontrolled movement of her right arm and was also more confused. Her daughter thought it may clear up but she did not, and with that, she brought her into the ER today. She apparently had hit her head per the patient's report to her daughter when she fell from the wheelchair, but her daughter was not present when the fall happened. She found her down on the ground. There was no noted loss of consciousness or incontinence. She does not have a history of seizures. In the emergency room, she was sent for MRI and CTA of the head and neck.    Neurology has been consulted from the ER, and I visited with her daughter in the CT area and examined the patient. Unfortunately, the patient is unable to provide any history or participate in her exam as she has been given Ativan to have cooperation for the MRI. NEUROLOGIC:   She has had spastic movements of the right arm, uncontrolled movements of the right arm, inability to coordinate the right arm, inability to feed herself, and more confused compared baseline. ASSESSMENT:    1. Fall at home from the wheelchair. 2.   Right hemiballismus. 3.   Possible stroke. 4.   History of stroke. 5.   Hypertension. 6.   History of left hip surgery leaving her wheelchair dependent. 7.   Nonambulatory status. PLAN:   Admission to the telemetry unit. IV fluid hydration and liberal hypertension. Follow up the results of the MRI of the brain and CTA of the head and neck as well as an echocardiogram.   Tomorrow's labs, CBC, lipid panel, CMP. Consult with Neurology. Continue fluid hydration. Continue aspirin daily, Lovenox for DVT prophylaxis. Hold her blood pressure medicine for now and see what things look like. I am also planning an EEG for her to make sure that this is not spastic-type movement from seizures. She will need a bedside swallow evaluation to start on a cardiac diet. We are going to follow her blood sugars a.c. and at bedtime. Consult with PT and OT. I have discussed the plan of care with her daughter who is at the bedside. I am expecting 48-72 hours in the hospital with possible SNF placement after discharge from here. LABORATORY DATA:   A CT of her head was done. It showed no acute intracranial abnormality but there is evidence of prior left frontal lobe infarcts with ex vacuo dilatation of the anterior horn of the left lateral ventricle. She had various labs done in the emergency room which included an EKG that showed right bundle-branch block, normal sinus rhythm.    Urinalysis with 1-3 wbc's, 2+ bacteria. A CTA head and neck has been performed. The results are pending. MRI of the brain has been performed, results are pending. White count is 9.1, H and H 11.2 and 34.8, platelets are 222. INR is 1.0, glucose 106. CTA Neck Impression:    1. No hemodynamically significant cervical vascular stenosis. 2. Evidence of intracranial atherosclerosis. Stenosis is most severe involving    the left ZOLTAN likely associated with the large chronic left ZOLTAN territory    infarction as well as inferior division right M2 segment MCA. Additional more    ont-family:      * [facility]          Neurology [address] [place] [address] - Clinical Indications for Admission to Inpatient Care by Oskar Olivarez RN         Review Status Review Entered   Completed 5/30/2019 12:59       Criteria Review      Clinical Indications for Admission to Inpatient Care   Most Recent : Meggan Rich Most Recent Date: 5/30/2019 12:59:54 EDT   (X) Hospital admission is needed for appropriate care of the patient because of1 or more of the    following:      (X) Altered mental status that is severe or persistent(20)(21)(22)(23)      5/30/2019 12:59:54 EDT by Meggan Rich   Patient's daughter reports that the patient fell from her wheelchair 3 days ago and since that time has had uncontrolled movements of her right upper extremity and appearing more confused.           (X) New-onset severe neurologic finding requiring inpatient care indicated by1 or more of the       following(28)(29)(30):         (X) Involuntary movements(35)         5/30/2019 12:59:54 EDT by Meggan Rich   right upper extremity with intermittent hemiballismus

## 2019-05-30 NOTE — PROCEDURES
ELECTROENCEPHALOGRAPHY     Patient: Shyla Wilkinson MRN: [id number]  CSN: [id number]    YOB: 1945  Age: 69 y.o. Sex: female    DOA: 5/29/2019 LOS:  LOS: 1 day        Date of Service: 05/30/2019    DICTATING: Hiren Shay MD     REFERRING PHYSICIAN: Dr. Severino Pacheco: 19-37    HISTORY OF PRESENT ILLNESS: This is a 70 yo female with hx of CVA, who presented with altered mental status and abnormal movements. This EEG was performed in order to assess electrodiagnostic risk factors for epilepsy. ELECTROENCEPHALOGRAM INTERPRETATION: This is a referential and bipolar EEG recorded with a 10-20 system. There's remarkable myogenic and movement artifacts during the recording. EEG background during wakefulness shows 10 hertz posterior dominant rhythm, which disappears with eye opening or activation procedures. There is generalized low amplitude beta activity intermixed with the background. A brief stage II sleep pattern is accompanied by vertex waves and sleep spindles. Photic stimulation does not produce an abnormal activity. There are intermittent left frontal and paracentral sharp transients during the recording. There is no electrographic or clinical seizure. IMPRESSION: This is essentially a normal EEG awake and asleep. The accuracy of EEG is limited due to myogenic and movement artifacts. The significance of sharp transients is indeterminate and they may indicate epileptiform pattern but it is not clear to discern. There are no electrographic seizures during the recording. Clinical correlation is recommended.          Signed:  Hiren Shay MD  5/30/2019  1:02 PM

## 2019-05-30 NOTE — WOUND CARE
Received consult for wound care. Attempted to see patient x2, unable to assess due to EEG and Echo testing. Will attempt to see and assess patient tomorrow.

## 2019-05-30 NOTE — CONSULTS
NEUROLOGY CONSULTATION NOTE    Patient: Sherry Kaye MRN: [id number]  CSN: [id number]    YOB: 1945  Age: 69 y.o. Sex: female    DOA: 5/29/2019 LOS:  LOS: 1 day        Requesting Physician: Dr. Neelam Ferrera  Reason for Consultation: Altered mental status, abnormal movements          HISTORY OF PRESENT ILLNESS:   Sherry Kaye is a 69 y.o. [place] speaking female with history of HTN, CVA and HLD, who presented with altered mental status. Her daughter gives partial history. Phone  could not help due to confusion. She had a stroke while in [facility] 15 years ago and she was struggling with right leg osteoarthritic issues for several years, needing to undergo hip and knee arthroplasty. Approximately 3 days ago she fell from her wheelchair and over the last 2 days she started becoming confused and she was moving her right arm in a circular motion. There was no noted loss of consciousness or incontinence. Her words did not make sense to her daughter and family. She does not have a history of seizures. Her brain MRI showed old left frontal infarct and multiple chronic infarcts on other sites. See report below. PAST MEDICAL HISTORY:  Past Medical History:   Diagnosis Date    Hyperlipemia     Stroke [facility])      PAST SURGICAL HISTORY:  Past Surgical History:   Procedure Laterality Date    HX KNEE ARTHROSCOPY      right hip     FAMILY HISTORY:  History reviewed. No pertinent family history.   SOCIAL HISTORY:  Social History     Socioeconomic History    Marital status: SINGLE     Spouse name: Not on file    Number of children: Not on file    Years of education: Not on file    Highest education level: Not on file   Tobacco Use    Smoking status: Never Smoker    Smokeless tobacco: Never Used   Substance and Sexual Activity    Alcohol use: Not Currently    Sexual activity: Never     MEDICATIONS:  Current Facility-Administered Medications   Medication Dose Route Frequency    melatonin tablet 5 mg 5 mg Oral QHS    atorvastatin (LIPITOR) tablet 20 mg  20 mg Oral DAILY    sodium chloride (NS) flush 5-40 mL  5-40 mL IntraVENous Q8H    sodium chloride (NS) flush 5-40 mL  5-40 mL IntraVENous PRN    0.9% sodium chloride infusion  100 mL/hr IntraVENous CONTINUOUS    ondansetron (ZOFRAN) injection 4 mg  4 mg IntraVENous Q6H PRN    aspirin chewable tablet 81 mg  81 mg Oral DAILY    acetaminophen (TYLENOL) tablet 650 mg  650 mg Oral Q4H PRN    enoxaparin (LOVENOX) injection 40 mg  40 mg SubCUTAneous Q24H    atorvastatin (LIPITOR) tablet 40 mg  40 mg Oral DAILY     Prior to Admission medications    Medication Sig Start Date End Date Taking? Authorizing Provider   atorvastatin (LIPITOR) 20 mg tablet Take 20 mg by mouth daily. Yes Other, MD Estelel   ergocalciferol (VITAMIN D2) 50,000 unit capsule Take 50,000 Units by mouth every seven (7) days. Yes Other, MD Estelle   losartan (COZAAR) 50 mg tablet Take 50 mg by mouth daily. Yes Other, MD Estelle       ALLERGIES:  No Known Allergies    Review of Systems  Unable to review due to altered mental status. PHYSICAL EXAMINATION:     Visit Vitals  /73   Pulse 83   Temp 97.5 °F (36.4 °C)   Resp 20   Ht 5' 2\" (1.575 m)   Wt 68.3 kg (150 lb 9.2 oz)   LMP  (LMP Unknown)   SpO2 (!) 89%   BMI 27.54 kg/m²      O2 Device: Room air  GENERAL: Confused, agitated. HEENT: Moist mucous membranes, sclerae anicteric, scalp is atraumatic. CVS: Regular rate and rhythm, no murmurs or gallops. No carotid bruits. PULMONARY: Clear to auscultation bilaterally. No rales or rhonchi. No wheezing. EXTREMITIES: Normal range of motion at all sites. No deformities. ABDOMEN: Soft, nontender. SKIN: No rashes or ecchymoses. Warm and dry. NEUROLOGIC: Confused, trying to get out of her bed. It is impossible to understand her language even by . She does not follow commands. She states her name but then she has echolalia and repeats some sentences over and over again.  Says \"no\" repeatedly. Face looks symmetric. She moves all the extremities irregularly. Left arm moves upo and right arm moves circular at times. She sticks her tongue at times. There is no regular pattern to her movements. They do not look chorieform, though at times, it is choreiform. She withdraws from noxious stimulus at all sites. Reflexes are difficult to assess. Labs: Results:       Chemistry Recent Labs     05/30/19  0555 05/29/19  1150   GLU 89 106*    143   K 3.8 3.7   * 109*   CO2 25 26   BUN 23* 32*   CREA 1.04 1.18   CA 8.4* 8.8   AGAP 9 8   BUCR 22* 27*   AP 75 85   TP 7.0 8.0   ALB 3.2* 3.6   GLOB 3.8 4.4*   AGRAT 0.8 0.8      CBC w/Diff Recent Labs     05/30/19  0555 05/29/19  1150   WBC 8.5 9.1   RBC 3.57* 3.77*   HGB 10.5* 11.2*   HCT 33.5* 34.8*    290   GRANS  --  61   LYMPH  --  20*   EOS  --  12*      Cardiac Enzymes Recent Labs     05/29/19  1150   *   CKND1 1.3      Coagulation Recent Labs     05/29/19  1150   PTP 12.6   INR 1.0   APTT 26.7       Lipid Panel Lab Results   Component Value Date/Time    Cholesterol, total 111 05/30/2019 05:55 AM    HDL Cholesterol 37 (L) 05/30/2019 05:55 AM    LDL, calculated 50.4 05/30/2019 05:55 AM    VLDL, calculated 23.6 05/30/2019 05:55 AM    Triglyceride 118 05/30/2019 05:55 AM    CHOL/HDL Ratio 3.0 05/30/2019 05:55 AM      BNP No results for input(s): BNPP in the last 72 hours. Liver Enzymes Recent Labs     05/30/19  0555   TP 7.0   ALB 3.2*   AP 75   SGOT 24      Thyroid Studies No results found for: T4, T3U, TSH, TSHEXT       Radiology:  Mri Brain Wo Cont    Result Date: 5/29/2019  Brain MR without contrast: Indication: History of fall 3 days previous from wheelchair. Uncontrollable movements right upper extremity and decreased level of consciousness with increased confusion. Procedure: Sagittal spin echo T1, axial FSE FLAIR and T2 and axial diffusion weighted scanning was performed.   An axial T2* scan optimized to detect hemosiderin and calcium was performed. Coronal spin echo T1and FSE T2 scans were also performed. No contrast was administered. Comparison exam: Head CT 5/29/2019. Findings: Diffusion: There are no areas of restricted diffusion, no acute infarction. The T2* scan shows no acute or chronic hemorrhage or abnormal calcifications. Brain parenchyma: [Substantial ex vacuo dilatation of the left lateral ventricle related to a likely chronic infarction in the superior medial left frontal lobe, an area of focal atrophy with substantial subcortical cystic encephalomalacia and some gliosis. Additional areas of in part fluid signal likely chronic lacunar infarction anterior limb internal capsule on the left and in the right thalamus are noted.] Confluent ischemic changes immediate periventricular white matter. There is substantial diffuse atrophy of the body of the corpus callosum. Likely areas of chronic infarction of bilateral paramedian upper anterior genu and bilateral body of the corpus callosum. The rostrum and splenium are present and relatively intact. No mass or mass effect. There is a right paracentral fluid signal lesion in the mid maged likely an area of chronic lacunar infarction. Likely fluid signal chronic area of infarction in the immediate periventricular right parietal lobe in part communicating with the lateral ventricle. Ventricles and sulci: Mild prominence of sulci in the perisylvian region. The temporal tips are mildly prominent. Extra axial: No extra axial fluid collection or mass. Brain vasculature: Normal, no arterial vascular abnormality is noted. Craniocervical Junction: Normal. Extracranial and skull base:  Mucosal thickening in the paranasal sinuses. No air-fluid level. Impression: 1. No acute hemorrhage or acute infarction. No evidence of significant acute brain injury. 2. Substantial chronic infarction in superior medial left frontal lobe.  Likely extensive chronic bilateral corpus callosum infarction. Chronic lacunar infarctions in the left anterior limb internal capsule, right thalamus, right parietal periventricular white matter and right paramedian maged. 3. Atrophy as described with ischemic white matter change. Ct Head Wo Cont    Result Date: 5/29/2019  EXAM: CT head INDICATION: Unwitnessed fall, increasing agitation. COMPARISON: None. TECHNIQUE: Axial CT imaging of the head was performed without intravenous contrast. One or more dose reduction techniques were used on this CT: automated exposure control, adjustment of the mAs and/or kVp according to patient size, and iterative reconstruction techniques. The specific techniques used on this CT exam have been documented in the patient's electronic medical record. Digital Imaging and Communications in Medicine (DICOM) format image data are available to nonaffiliated external healthcare facilities or entities on a secure, media free, reciprocally searchable basis with patient authorization for at least a 12-month period after this study. _______________ FINDINGS: BRAIN AND POSTERIOR FOSSA: There is mild cortical and cerebellar volume loss present. There is ex vacuo dilatation of the left lateral ventricle related to adjacent areas of encephalomalacia within the left frontal lobe and left centrum semiovale basilar cisterns are patent. Subcortical and periventricular white matter low-attenuation is present. Bilateral physiologic basal ganglia calcifications. There is no intracranial hemorrhage, mass effect, or midline shift. Gray-white matter differentiation is within normal limits. EXTRA-AXIAL SPACES AND MENINGES: There are no abnormal extra-axial fluid collections. CALVARIUM: Intact. SINUSES: Imaged paranasal sinuses and mastoid air cells are clear. OTHER: None. _______________     IMPRESSION: 1. No acute intracranial abnormality.  2. Evidence of prior left frontal lobe infarcts with associated ex vacuo dilatation of the anterior horn of the left lateral ventricle. 3. Subcortical and periventricular white matter low-attenuation; favored to reflect sequela of chronic ischemic microvascular change. Cta Head Neck W Wo Cont    Addendum Date: 5/29/2019    Addendum: 4. Somewhat irregular small right upper lobe lung nodule. This could be an area of scarring. Chest CT for correlation is suggested. Result Date: 5/29/2019  Brain CT angiogram and Neck CT angiogram: Indication: Evaluate for stenosis. Possible infarction. Evaluate for source of embolus. History of recent fall with possible head injury. Uncontrolled subsequent movements of her right upper extremity and decreased level of consciousness with increased confusion. Not certain as to the reason why brain and neck vascular imaging was not performed at the time of brain MRI performed earlier. Procedure: Neck CT angiogram: Contrast was administered with a power injector. Axial thin section volume acquisition  scans were obtained timed for peak arterial enhancement. An automated triggering system was used. Axial images were generated. Angiographic coronal and sagittal reformations were also generated. Extensive 3-D separate workstation processing was performed by [employer]. Brain CT angiogram: Dedicated slab MIP overlapping angiographic reconstructions in the sagittal, axial and coronal plane of the brain component of the axial evaluation were also performed. Extensive 3-D separate workstation processing was performed by [employer]. One or more dose reduction techniques were used on this CT: automated exposure control, adjustment of the mAs and/or kVp according to patient size, and iterative reconstruction techniques. The specific techniques used on this CT exam have been documented in the patient's electronic medical record.  Digital Imaging and Communications in Medicine (DICOM) format image data are available to nonaffiliated external healthcare facilities or entities on a secure, media free, reciprocally searchable basis with patient authorization for at least a 12-month period after this study. Comparison exam: Head CT and brain MRI 5/29/2019. Findings: Neck CTA: Any internal carotid stenosis described below uses NASCET criteria, minimal residual lumen diameter versus the non-tapering cervical internal carotid artery. Aortic Arch: Normal branching pattern. No proximal great vessel stenosis. Some ulcerative changes along the aortic arch without large aneurysm or definitive high-grade stenosis. Left carotid: No stenosis or other vascular abnormality. Right carotid:  Mild calcified plaque carotid bulb but no substantial diameter stenosis or other vascular abnormality. The vertebral arteries are left dominant. Right vertebral artery:  Is characterized by calcified subclavian plaque adjacent to the origin without significant stenosis of the right vertebral which is patent to the basilar. Left vertebral artery:  No stenosis or other vascular abnormality. Lung apices: There is a small nodule in the right lung apex measuring 3 mm with minimal linear density, possibly chronic scarring. On the coronal reformations the lesion has a potentially mildly spiculated appearance and measures 4 mm. No other suspicious lung nodule. Neck soft tissues: Lobulated prominence of the lingual tonsils and prominence of the palatine tonsils are symmetric likely reactive. No definite invasive mass or pathologic adenopathy with small likely reactive lymph nodes bilaterally. Brain CTA: Carotid siphon and supraclinoid internal carotid artery: There is some calcified plaque in the carotid siphon but no definitive high-grade siphon stenosis. Mild stenosis of the junction of the cavernous and supraclinoid internal carotid on the right, felt to be less than 50%.  There is a suspicion of a small contour abnormality projecting anterior of the anterior aspect of the right anterior cavernous carotid loop projecting anterior and just lateral, clearly intracavernous but potentially a tiny aneurysm very wide neck measuring 2 mm. No other supraclinoid internal carotid artery aneurysm or stenosis. M1 segment and proximal M2 segment MCA:  Mild-moderate mid right M1 segment stenosis with some irregularity of contour but no definitive high-grade stenosis substantial stenosis origin inferior division right M2 segment, clearly greater than 50%. No left M1 or proximal M2 segment stenosis. . A1 segment, anterior communicating artery and proximal A2 segments:  No significant stenosis or aneurysm. Vertebrobasilar system:  Variant anatomy is elongated V4 segment right vertebral artery with a very distal vertebral basilar junction. Dominant left vertebral. Fenestration proximal V4 segment right vertebral without associated aneurysm. Mild irregularity left P2 segment PCA. No definite high-grade PCA stenosis. Distal anterior cerebral artery:  Multifocal areas of anterior cerebral artery pericallosal artery stenosis particularly of the smaller left pericallosal artery with an area of severe stenosis just anterior to the anterior genu of the corpus callosum but multifocal disease. Of note there is a chronic substantial left ZOLTAN territory infarction in the distal left ZOLTAN is a very small vessel. Distal MCA M2/M3 segment:  There are some areas of distal MCA stenosis, multifocal consistent with additional areas of intracranial atherosclerosis. No other significant vascular abnormality. Brain parenchyma on source data:  No new parenchymal brain abnormality other than what was previously noted on brain MRI with a substantial chronic superior medial left hemispheric ZOLTAN territory infarction and additional chronic small vessel infarctions. Impression: 1. No hemodynamically significant cervical vascular stenosis. 2. Evidence of intracranial atherosclerosis.  Stenosis is most severe involving the left ZOLTAN likely associated with the large chronic left ZOLTAN territory infarction as well as inferior division right M2 segment MCA. Additional more distal MCA and ZOLTAN stenosis as described above. Less severe potentially moderate stenosis mid right M1 segment MCA. 3. 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 ASSESSMENT/IMPRESSION:  Altered mental status of unclear etiology. There does not seem to be any medication related etiology. She may be having frontal lobe seizures. We will get an EEG. I will load her with Depakote. She may have toxic/metabolic encephalopathy as well. Her baseline is not clear and she may have a baseline vascular dementia. RECOMMENDATIONS:  1. EEG  2. Use lorazepam 1mg prn as needed (for EEG if she is restless)  3. Valproic acid 500 mg IV BID  4. Check B12, ammonia. I will follow the patient.  Please do not hesitate to return with any questions.    ------------------------------------  Aria Muller MD  5/30/2019  7:35 AM

## 2019-05-30 NOTE — PROGRESS NOTES
1930: Bedside and Verbal shift change report given to PERRY Kilpatrick  and Tres Jones RN (oncoming nurse) by MAYI Vides RN (offgoing nurse). Report included the following information SBAR, Kardex, Intake/Output, MAR, Recent Results and Cardiac Rhythm SR.   2000: Patient assessed. Family in room. Patient is lethargic, but responds to noxious stimuli. Patient is on q 2hr Neuro assessments until 0400. Will continue to monitor. 2200: Patient reassessed. The patient is still lethargic, but the response to stimuli is greater. 0000: Patient reassessed. The patient is lethargic, but response to stimuli is greater than the last assessment. 0210: Paged doctor Anthony Suresh. Patient is awake, confused, pulling at lines, and attempting to get out of bed, but is unsteady. A  was contacted. The  stated that the patient's statements were a flight of ideas and not making sense. The doctor is placing an order for Melatonin and a sitter for patient safety. 0240: Swallow test completed. Patient passed. Melatonin given. Patient spit tablet out. Melatonin crushed, patient still attempted to spit out. Full dose ultimately received. 80: Charge nurse notified of order for sitter, non available at this time. 0300: Patient is still restless, pulling at lines, and confused, patient wrapping monitor lines around neck, undressing, and throwing CV monitor box off of bed. There is no sitter at the bedside. 9471: Patient assessment completed. Patient continues to be restless, confused, and pulling at lines. No new changes to report. 0715: Bedside and Verbal shift change report given to SANDY Mcgee RN (oncoming nurse) by Janell Ch RN (offgoing nurse).  Report included the following information SBAR, Kardex, Intake/Output, MAR, Recent Results and Cardiac Rhythm SR.

## 2019-05-30 NOTE — PROGRESS NOTES
Problem: Dysphagia (Adult)  Goal: *Acute Goals and Plan of Care (Insert Text)  Description  Recommendations:  Diet: Regular/thin  Meds: Per patient preference  Aspiration Precautions  Oral Care TID  Other: Feeding assistance, slow rate    Goals:  Patient will:  1. Tolerate PO trials with 0 s/s overt distress in 4/5 trials  2. Utilize compensatory swallow strategies/maneuvers (decrease bite/sip, size/rate, alt. liq/sol) with min cues in 4/5 trials   Outcome: Progressing Towards Goal    SPEECH LANGUAGE PATHOLOGY BEDSIDE SWALLOW   EVALUATION & TREATMENT     Patient: Geoff Rosales (25 y.o. female)  Date: 5/30/2019  Primary Diagnosis: Hemiballismus [G25.5]  Confusion [R41.0]        Precautions: Fall    PLOF: Independent    ASSESSMENT :  Based on the objective data described below, the patient presents with intact swallow function in the setting of AMS. Pt A&Ox0. Pt speaks [place]. Daughter at bedside provided translation. Decreased command following. Regular diet at baseline with no PMHx of dysphagia. Oral-motor exam revealed structures grossly intact for mastication and deglutition. Patient accepted SLP-fed thin liquid + straw and solid trials. Required maxA to reduce rate of intake from straw. Pt exhibited adequate bolus cohesion, manipulation and A-P transit. Further exhibited adequate swallow timing/reflex and hyolaryngeal excursion. Able to manipulate and clear with 0 clinical s/s aspiration and/or oropharyngeal dysphagia. Pt safe for regular solid, thin liquid diet with feeding assistance. Small sips and bites. D/w RN, Juliana Rodriges. TREATMENT :  Skilled therapy initiated; Educated pt on aspiration precautions and importance of compensatory swallow techniques to decrease aspiration risk (decrease rate of intake & sip/bite size, upright @HOB for all po intake and ~30 minutes after po); verbalized comprehension. SLP to follow up 1-2x to assess diet tolerance and education.       Patient will benefit from skilled intervention to address the above impairments. Patient's rehabilitation potential is considered to be Fair  Factors which may influence rehabilitation potential include:   ? None noted  ? Mental ability/status  ? Medical condition  ? Home/family situation and support systems  ? Safety awareness  ? Pain tolerance/management  ? Other:      PLAN :  Recommendations and Planned Interventions:  Regular/thin  Frequency/Duration: Patient will be followed by speech-language pathology 1-2 times to address goals. Discharge Recommendations: Skilled Nursing Facility     SUBJECTIVE:   Patient stated ? No?.    OBJECTIVE:     Past Medical History:   Diagnosis Date    Hyperlipemia     Stroke [facility])      Past Surgical History:   Procedure Laterality Date    HX KNEE ARTHROSCOPY      right hip     Home Situation:      Diet prior to admission: Regular/thin  Current Diet: Regular/thin     Cognitive and Communication Status:  Neurologic State: Alert, Confused  Orientation Level: Disoriented X4  Cognition: Decreased attention/concentration, Decreased command following  Perception: Appears intact  Perseveration: Perseverates during conversation, Perseverates during mobility  Safety/Judgement: Decreased awareness of environment, Decreased awareness of need for assistance, Decreased awareness of need for safety, Decreased insight into deficits  Oral Assessment:  Oral Assessment  Labial: No impairment  Dentition: Natural;Intact  Oral Hygiene: Good  Lingual: No impairment  Velum: No impairment  Mandible: No impairment  P.O. Trials:  Patient Position: HOB 60  Vocal quality prior to P.O.: No impairment  Consistency Presented: Thin liquid;Puree; Solid  How Presented: SLP-fed/presented;Straw;Successive swallows     Bolus Acceptance: No impairment  Bolus Formation/Control: No impairment     Propulsion: No impairment  Oral Residue: None  Initiation of Swallow: No impairment  Laryngeal Elevation: Functional  Aspiration Signs/Symptoms: None  Pharyngeal Phase Characteristics: No impairment, issues, or problems   Effective Modifications: Small sips and bites  Cues for Modifications: Maximal       Oral Phase Severity: No impairment  Pharyngeal Phase Severity : No impairment    PAIN:  Pain level pre-treatment: 0/10   Pain level post-treatment: 0/10     After treatment:   ?            Patient left in no apparent distress sitting up in chair  ? Patient left in no apparent distress in bed  ? Call bell left within reach  ? Nursing notified  ? Family present  ? Caregiver present  ? Bed alarm activated    COMMUNICATION/EDUCATION:   ?            Aspiration precautions; swallow safety; compensatory techniques. ?            Patient/family have participated as able in goal setting and plan of care. ?            Patient/family agree to work toward stated goals and plan of care. ?            Patient understands intent and goals of therapy; neutral about participation. ? Patient unable to participate in goal setting/plan of care; educ ongoing with interdisciplinary staff  ? Posted safety precautions in patient's room.     Thank you for this referral.  Virginia Jay SLP  Time Calculation: 21 mins  Evaluation Time: 11 minutes   Treatment Time: 10 minutes

## 2019-05-30 NOTE — PROGRESS NOTES
Orders received. Chart reviewed. Attempt for PT assessment at this time w/ OT present. Pt presents to be confused and oriented to self only. Unable to follow commands in [address] Attempt to assist pt for OOB mobility and bed mobility pt resisting during activity. Pt requires total assist x2 for the same. Confirmed with Dr. Carmen Kaiser and agreed to d/c current PT orders until pt is more oriented and safe to participate with mobility task.   Thank you for the referral.

## 2019-05-31 ENCOUNTER — APPOINTMENT (OUTPATIENT)
Dept: GENERAL RADIOLOGY | Age: 74
DRG: 056 | End: 2019-05-31
Attending: PSYCHIATRY & NEUROLOGY
Payer: COMMERCIAL

## 2019-05-31 LAB
ALBUMIN SERPL-MCNC: 3.4 G/DL (ref 3.4–5)
ALBUMIN/GLOB SERPL: 0.8 {RATIO} (ref 0.8–1.7)
ALP SERPL-CCNC: 90 U/L (ref 45–117)
ALT SERPL-CCNC: 30 U/L (ref 13–56)
AMPHET UR QL SCN: NEGATIVE
ANION GAP SERPL CALC-SCNC: 9 MMOL/L (ref 3–18)
APPEARANCE UR: CLEAR
AST SERPL-CCNC: 23 U/L (ref 15–37)
BACTERIA URNS QL MICRO: NEGATIVE /HPF
BARBITURATES UR QL SCN: NEGATIVE
BENZODIAZ UR QL: NEGATIVE
BILIRUB SERPL-MCNC: 0.6 MG/DL (ref 0.2–1)
BILIRUB UR QL: NEGATIVE
BUN SERPL-MCNC: 14 MG/DL (ref 7–18)
BUN/CREAT SERPL: 14 (ref 12–20)
CALCIUM SERPL-MCNC: 8.7 MG/DL (ref 8.5–10.1)
CANNABINOIDS UR QL SCN: NEGATIVE
CHLORIDE SERPL-SCNC: 107 MMOL/L (ref 100–108)
CO2 SERPL-SCNC: 25 MMOL/L (ref 21–32)
COCAINE UR QL SCN: NEGATIVE
COLOR UR: YELLOW
CREAT SERPL-MCNC: 0.98 MG/DL (ref 0.6–1.3)
EPITH CASTS URNS QL MICRO: ABNORMAL /LPF (ref 0–5)
ERYTHROCYTE [DISTWIDTH] IN BLOOD BY AUTOMATED COUNT: 12.9 % (ref 11.6–14.5)
FOLATE SERPL-MCNC: >20 NG/ML (ref 3.1–17.5)
GLOBULIN SER CALC-MCNC: 4.2 G/DL (ref 2–4)
GLUCOSE SERPL-MCNC: 90 MG/DL (ref 74–99)
GLUCOSE UR STRIP.AUTO-MCNC: NEGATIVE MG/DL
HCT VFR BLD AUTO: 33.7 % (ref 35–45)
HDSCOM,HDSCOM: NORMAL
HGB BLD-MCNC: 11.2 G/DL (ref 12–16)
HGB UR QL STRIP: NEGATIVE
KETONES UR QL STRIP.AUTO: NEGATIVE MG/DL
LEUKOCYTE ESTERASE UR QL STRIP.AUTO: NEGATIVE
MCH RBC QN AUTO: 30.3 PG (ref 24–34)
MCHC RBC AUTO-ENTMCNC: 33.2 G/DL (ref 31–37)
MCV RBC AUTO: 91.1 FL (ref 74–97)
METHADONE UR QL: NEGATIVE
NITRITE UR QL STRIP.AUTO: NEGATIVE
OPIATES UR QL: NEGATIVE
PCP UR QL: NEGATIVE
PH UR STRIP: 6 [PH] (ref 5–8)
PLATELET # BLD AUTO: 246 K/UL (ref 135–420)
PMV BLD AUTO: 9.6 FL (ref 9.2–11.8)
POTASSIUM SERPL-SCNC: 3.7 MMOL/L (ref 3.5–5.5)
PROT SERPL-MCNC: 7.6 G/DL (ref 6.4–8.2)
PROT UR STRIP-MCNC: 30 MG/DL
RBC # BLD AUTO: 3.7 M/UL (ref 4.2–5.3)
RBC #/AREA URNS HPF: NEGATIVE /HPF (ref 0–5)
SODIUM SERPL-SCNC: 141 MMOL/L (ref 136–145)
SP GR UR REFRACTOMETRY: 1.01 (ref 1–1.03)
UROBILINOGEN UR QL STRIP.AUTO: 0.2 EU/DL (ref 0.2–1)
VIT B12 SERPL-MCNC: 680 PG/ML (ref 211–911)
WBC # BLD AUTO: 9.6 K/UL (ref 4.6–13.2)
WBC URNS QL MICRO: NEGATIVE /HPF (ref 0–5)

## 2019-05-31 PROCEDURE — 80053 COMPREHEN METABOLIC PANEL: CPT

## 2019-05-31 PROCEDURE — 92526 ORAL FUNCTION THERAPY: CPT

## 2019-05-31 PROCEDURE — 77030037877 HC DRSG MEPILEX >48IN BORD MOLN -A

## 2019-05-31 PROCEDURE — 74011250637 HC RX REV CODE- 250/637: Performed by: PSYCHIATRY & NEUROLOGY

## 2019-05-31 PROCEDURE — 74011250637 HC RX REV CODE- 250/637: Performed by: FAMILY MEDICINE

## 2019-05-31 PROCEDURE — 77030011256 HC DRSG MEPILEX <16IN NO BORD MOLN -A

## 2019-05-31 PROCEDURE — 74011250636 HC RX REV CODE- 250/636: Performed by: HOSPITALIST

## 2019-05-31 PROCEDURE — 77030011943

## 2019-05-31 PROCEDURE — 71046 X-RAY EXAM CHEST 2 VIEWS: CPT

## 2019-05-31 PROCEDURE — 74011000250 HC RX REV CODE- 250: Performed by: HOSPITALIST

## 2019-05-31 PROCEDURE — 65660000000 HC RM CCU STEPDOWN

## 2019-05-31 PROCEDURE — 80307 DRUG TEST PRSMV CHEM ANLYZR: CPT

## 2019-05-31 PROCEDURE — 74011250637 HC RX REV CODE- 250/637: Performed by: HOSPITALIST

## 2019-05-31 PROCEDURE — 85027 COMPLETE CBC AUTOMATED: CPT

## 2019-05-31 PROCEDURE — 36415 COLL VENOUS BLD VENIPUNCTURE: CPT

## 2019-05-31 PROCEDURE — 81001 URINALYSIS AUTO W/SCOPE: CPT

## 2019-05-31 PROCEDURE — 82607 VITAMIN B-12: CPT

## 2019-05-31 PROCEDURE — 87086 URINE CULTURE/COLONY COUNT: CPT

## 2019-05-31 RX ORDER — ASCORBIC ACID 250 MG
500 TABLET ORAL DAILY
Status: DISCONTINUED | OUTPATIENT
Start: 2019-06-01 | End: 2019-06-06 | Stop reason: HOSPADM

## 2019-05-31 RX ORDER — QUETIAPINE FUMARATE 25 MG/1
25 TABLET, FILM COATED ORAL
Status: DISCONTINUED | OUTPATIENT
Start: 2019-05-31 | End: 2019-06-05

## 2019-05-31 RX ADMIN — ASPIRIN 81 MG 81 MG: 81 TABLET ORAL at 10:02

## 2019-05-31 RX ADMIN — QUETIAPINE FUMARATE 25 MG: 25 TABLET ORAL at 22:09

## 2019-05-31 RX ADMIN — ENOXAPARIN SODIUM 40 MG: 40 INJECTION SUBCUTANEOUS at 13:41

## 2019-05-31 RX ADMIN — Medication 10 ML: at 13:41

## 2019-05-31 RX ADMIN — CEFTRIAXONE 1 G: 1 INJECTION, POWDER, FOR SOLUTION INTRAMUSCULAR; INTRAVENOUS at 13:41

## 2019-05-31 RX ADMIN — Medication 10 ML: at 22:09

## 2019-05-31 RX ADMIN — ATORVASTATIN CALCIUM 20 MG: 20 TABLET, FILM COATED ORAL at 10:02

## 2019-05-31 RX ADMIN — Medication 5 MG: at 22:09

## 2019-05-31 NOTE — PROGRESS NOTES
Hospitalist Progress Note    Patient: Francia Liang MRN: [id number]  CSN: [id number]    YOB: 1945  Age: 69 y.o. Sex: female    DOA: 5/29/2019 LOS:  LOS: 2 days          Chief Complaint:    encephalopathy      Assessment/Plan   70 yo  female with hx of CVA and wheelchair dependence came with abnormal arm movements and confusion  she had a Fall at home.     Right arm abnormal movements-still happening, but no clear etiology other than prior CVA changes, none acute seen    Encephalopathy/confusion, may be due to vascular dementia, plus recent fall, no clear seizures occurring or by EEG-seroquel started by neurology    Possible UTI-start rocephin for 3 days empirically-bact on UA, but no leuk-get cath urine, CXR result pending    History of stroke, extensive left sided disease    Hypertension. stable    Wheelchair dependence due to prior hip injury.     Monitor in hospital  Watch mental status  Can d/c tele monitor  PT as tolerated if able to cooperate-she was not yesterday    Discussed with daughter    Goal-SNF on d/c, possibly 24-48 hrs        Disposition :  Patient Active Problem List   Diagnosis Code    Hemiballismus G25.5    Confusion R41.0    Hypertension I10    Wheelchair bound Z99.3    Fall at home, sequela W19. Izzy Lazo, Y92.009    History of stroke Z86.73       Subjective:    She is alert and seems more interactive this am  She does not speak english but seems to follow basic commands and say \"ok\"    Review of systems:    Unable to get ROS due to confused state     I spoke to daughter over phone      Vital signs/Intake and Output:  Visit Vitals  /74   Pulse 88   Temp 97.4 °F (36.3 °C)   Resp 18   Ht 5' 2\" (1.575 m)   Wt 63.5 kg (140 lb)   SpO2 98%   BMI 25.61 kg/m²     Current Shift:  No intake/output data recorded.   Last three shifts:  05/29 1901 - 05/31 0700  In: 250 [P.O.:250]  Out: 250 [Urine:250]    Exam:    General: elderly  female, NAD  CVS:Regular rate and rhythm, no M/R/G, S1/S2 heard, no thrill  Lungs:Clear to auscultation bilaterally, no wheezes, rhonchi, or rales  Abdomen: Soft, Nontender, No distention, Normal Bowel sounds, No hepatomegaly  Extremities: No C/C/E, pulses palpable 2+  Skin:normal texture and turgor, no rashes, no lesions  Psych:still moving arms and legs over bed , restless                Labs: Results:       Chemistry Recent Labs     05/31/19  0255 05/30/19  0555 05/29/19  1150   GLU 90 89 106*    145 143   K 3.7 3.8 3.7    111* 109*   CO2 25 25 26   BUN 14 23* 32*   CREA 0.98 1.04 1.18   CA 8.7 8.4* 8.8   AGAP 9 9 8   BUCR 14 22* 27*   AP 90 75 85   TP 7.6 7.0 8.0   ALB 3.4 3.2* 3.6   GLOB 4.2* 3.8 4.4*   AGRAT 0.8 0.8 0.8      CBC w/Diff Recent Labs     05/31/19  0255 05/30/19  0555 05/29/19  1150   WBC 9.6 8.5 9.1   RBC 3.70* 3.57* 3.77*   HGB 11.2* 10.5* 11.2*   HCT 33.7* 33.5* 34.8*    271 290   GRANS  --   --  61   LYMPH  --   --  20*   EOS  --   --  12*      Cardiac Enzymes Recent Labs     05/29/19  1150   *   CKND1 1.3      Coagulation Recent Labs     05/29/19  1150   PTP 12.6   INR 1.0   APTT 26.7       Lipid Panel Lab Results   Component Value Date/Time    Cholesterol, total 111 05/30/2019 05:55 AM    HDL Cholesterol 37 (L) 05/30/2019 05:55 AM    LDL, calculated 50.4 05/30/2019 05:55 AM    VLDL, calculated 23.6 05/30/2019 05:55 AM    Triglyceride 118 05/30/2019 05:55 AM    CHOL/HDL Ratio 3.0 05/30/2019 05:55 AM      BNP No results for input(s): BNPP in the last 72 hours.    Liver Enzymes Recent Labs     05/31/19  0255   TP 7.6   ALB 3.4   AP 90   SGOT 23      Thyroid Studies No results found for: T4, T3U, TSH, TSHEXT     Procedures/imaging: see electronic medical records for all procedures/Xrays and details which were not copied into this note but were reviewed prior to creation of Loli Rivera MD

## 2019-05-31 NOTE — ROUTINE PROCESS
Bedside and Verbal shift change report given to Gisella Drake  (oncoming nurse) by Allan Anaya RN  (offgoing nurse). Report given with SBAR, Kardex, Intake/Output and Recent Results.

## 2019-05-31 NOTE — ROUTINE PROCESS
Bedside and Verbal shift change report given to MAYI Branch R.N (oncoming nurse) by SANDY Brantley R.N. (offgoing nurse). Report included the following information SBAR, Kardex, Intake/Output, MAR, Accordion, Recent Results and Med Rec Status.

## 2019-05-31 NOTE — WOUND CARE
Wound Care Progress Note       New consult placed for ankle and buttock wound. Assessment:   Communication: Patient alert, assisted with assessment, has sitter  Wearing briefs for incontinence. Requires partial assists in repositioning. Diet: As ordered    Right ankle does not have open, draining wound. Previous wound from unknown source is scabbed over. 1. POA, Stage II PI to left buttock/gluteal cleft. No record of original staging = 1 x 0.6 x 0.1 cm    Base is 100% of pink/red tissue. Scant amount of serosanginous exudate. Periwound intact & without erythema. Margins are open. Treatment: Proshield and Mepilex border      Patient repositioned on right side with pillow between the knees. Wound Recommendations:    Proshield and Mepilex border to right ankle and left buttock wound to be changed twice weekly or PRN soiled/saturated. May secure ankle dressing with loose ACE wrap, if needed. Skin Care & Pressure Relief Recommendations:  Minimize layers of linen/pads under patient to optimize support surface. Turn/reposition approximately every 2 hours and offload heels pillows or Prevalon boots.   Manage incontinence / promote continence; Proshield to buttocks and sacrum daily and as needed with incontinence care    Discussed above plan with patient & Anderson Petit RN, Angelica Nair MD    Transition of Care: Plan to follow weekly and per product recommendations while admitted to hospital.

## 2019-05-31 NOTE — PROGRESS NOTES
0750:Received verbal bedside report from off going nurse PERRY Clark R.N. Patient care received. Patient alert and disoriented x 4. Patient resting in bed. Patient stable. Call light with in reach bed in lowest position. 0940:Received telephone orders for patient to receive one time dose of 1 mg I.V. Ativan so that patient can complete EEG.

## 2019-05-31 NOTE — PROGRESS NOTES
0715:Received verbal bedside report from off going nurse MAYI Branch R.N.. Patient care received. Patient alert and disoriented x 4 Patient resting in bed. Patient stable. Call light with in reach bed in lowest position.

## 2019-05-31 NOTE — PROGRESS NOTES
Problem: Dysphagia (Adult)  Goal: *Acute Goals and Plan of Care (Insert Text)  Description  Recommendations:  Diet: Regular/thin  Meds: Per patient preference  Aspiration Precautions  Oral Care TID  Other: Feeding assistance, slow rate    Goals:  Patient will:  1. Tolerate PO trials with 0 s/s overt distress in 4/5 trials - goal met  2. Utilize compensatory swallow strategies/maneuvers (decrease bite/sip, size/rate, alt. liq/sol) with min cues in 4/5 trials - goal met   Outcome: Resolved/Met    SPEECH LANGUAGE PATHOLOGY DYSPHAGIA TREATMENT & DISCHARGE    Patient: Eli Villaseñor (56 y.o. female)  Date: 5/31/2019  Diagnosis: Hemiballismus [G25.5]  Confusion [R41.0] <principal problem not specified>       Precautions: Aspiration   PLOF: Independent    ASSESSMENT:  Followed up this day with dysphagia intervention. Patient alert, perseverative and echolalic speech, intermittent English \"Thank you so much\". Intermittent 1-step command following with model. Accepted SLP-fed thin liquid + straw with single and serial swallows, puree and solids. Required modA to reduce rate of intake from straw. Pt exhibited adequate bolus cohesion, manipulation and A-P transit. Further exhibited adequate swallow timing/reflex and hyolaryngeal excursion. Able to manipulate and clear with 0 clinical s/s aspiration and/or oropharyngeal dysphagia. Pt safe for regular solid, thin liquid diet with feeding assistance. Small sips and bites. Maximum therapeutic gains met; safest, least restrictive diet achieved in current in-patient/acute setting. Accordingly, SLP to d/c intervention at this time. Progression toward goals:  ?         Improving appropriately - goals met/approximated  ? Not making progress/Not appropriate - will d/c POC     PLAN:  Recommendations and Planned Interventions:  Maximum therapeutic gains met; safest, least restrictive diet achieved. D/C ST intervention at this time.    Discharge Recommendations:  Skilled Nursing Facility     SUBJECTIVE:   Patient stated ? Thank you so much? .    OBJECTIVE:   Cognitive and Communication Status:  Neurologic State: Confused  Orientation Level: Disoriented X4(per family member. Pt speaks Japanese.)  Cognition: Impulsive, Impaired decision making, Decreased command following, Poor safety awareness, Memory loss  Perception: Appears intact  Perseveration: Perseverates during conversation, Perseverates during mobility  Safety/Judgement: Decreased awareness of environment, Decreased awareness of need for assistance, Decreased awareness of need for safety, Decreased insight into deficits  Dysphagia Treatment:  Oral Assessment:  Oral Assessment  Labial: No impairment  Dentition: Natural, Intact  Oral Hygiene: Good  Lingual: No impairment  Velum: No impairment  Mandible: No impairment  P.O. Trials:   Patient Position: HOB 60   Vocal quality prior to P.O.: No impairment   Consistency Presented: Thin liquid, Puree, Solid   How Presented: SLP-fed/presented, Straw, Successive swallows       Bolus Acceptance: No impairment   Bolus Formation/Control: No impairment       Propulsion: No impairment   Oral Residue: None   Initiation of Swallow: No impairment   Laryngeal Elevation: Functional   Aspiration Signs/Symptoms: None   Pharyngeal Phase Characteristics: No impairment, issues, or problems    Effective Modifications: Small sips and bites   Cues for Modifications: Maximal         Oral Phase Severity: No impairment   Pharyngeal Phase Severity : No impairment     PAIN:  Pain level pre-treatment: 0/10   Pain level post-treatment: 0/10     After treatment:   ?              Patient left in no apparent distress sitting up in chair  ? Patient left in no apparent distress in bed  ? Call bell left within reach  ? Nursing notified  ? Caregiver present  ?               Bed alarm activated      COMMUNICATION/EDUCATION:   ? Aspiration precautions; swallow safety; compensatory techniques  ? Patient unable to participate in education; education ongoing with staff  ? Posted safety precautions in patient's room.   ? Oral-motor/laryngeal strengthening exercises    Thank you for this referral,  Deja Shultz, SLP  Time Calculation: 12 mins

## 2019-05-31 NOTE — PROGRESS NOTES
Problem: Pressure Injury - Risk of  Goal: *Prevention of pressure injury  Description  Document Theodore Scale and appropriate interventions in the flowsheet. Outcome: Progressing Towards Goal     Problem: Falls - Risk of  Goal: *Absence of Falls  Description  Document Jimbo Whittington Fall Risk and appropriate interventions in the flowsheet.   Outcome: Progressing Towards Goal     Problem: Patient Education: Go to Patient Education Activity  Goal: Patient/Family Education  Outcome: Progressing Towards Goal     Problem: Patient Education: Go to Patient Education Activity  Goal: Patient/Family Education  Outcome: Progressing Towards Goal

## 2019-05-31 NOTE — PROGRESS NOTES
INITIAL NUTRITION ASSESSMENT     RECOMMENDATIONS/PLAN:   Supplement: Deangelo BID  Add MVI and Vit C 500mg x 10d  Monitor labs/lytes, PO intakes, skin integrity, wt, fluid status, BM    REASON FOR ASSESSMENT:     []  RN Referral:    [x] MST score >/=2  Malnutrition Screening Tool (MST):  Recently Lost Weight Without Trying: Unsure     Eating Poorly Due to Decreased Appetite: No  MST Score: 2      NUTRITION ASSESSMENT:   Client History: 69 yrs old Female admitted with fall, right arm abnormality, encephalopathy/confusion-EEG, MRI negative for CVA, done, possible UTI, HTN, SLP following       PMHx: Hyperlipidemia, hypertension, stroke, left hip surgery, left heel ulcer that previously required a wound VAC. Cultural/[employer] Food Preferences: None Identified    FOOD/NUTRITION HISTORY  Diet History: CNA reported pt ate 100% of meal; unable to ask pt a lot of questions at this time-only answer yes   Food Allergies:  [x] NKFA       Pertinent PTA Medications: vit d2     NUTRITION INTAKE   Diet Order: AHA      Average PO Intake:       Patient Vitals for the past 100 hrs:   % Diet Eaten   05/31/19 0812 100 %   05/30/19 1152 90 %      Pertinent Medications:  [x] Reviewed;    Electrolyte Replacement Protocol: []K  []Mg  []PO4    Insulin:  [] SSI  [] Pre-meal   []  Basal   [] Drip  [] None  Pt expected to meet estimated nutrient needs through next review:          [x]  Yes     [] No;  ANTHROPOMETRICS  Height: 5' 2\" (157.5 cm)       Weight: 63.5 kg (140 lb)    BMI: 27.5 kg/m^2  -  overweight (25.0%-29.9% BMI)        Weight change: pt was 158# on 7/83/18 currently pt is 140# -11.38% wt loss which is not significant wt loss                                   Comparison to Reference Standards:  IBW: 110 lbs      %IBW: 127%      AdjBW: n/a    NUTRITION-FOCUSED PHYSICAL ASSESSMENT  Skin: Wound POA  GI: No BM    BIOCHEMICAL DATA & MEDICAL TESTS  Pertinent Labs:  [x] Reviewed;      NUTRITION PRESCRIPTION  Calories: 6058-2400 kcal/day based on 30-35kcal/kg  Protein: 77-96 g/day based on 1.2-1.5 g/kg  Fluid: 1920-2240ml/day based on 1 kcal/ml      NUTRITION DIAGNOSES:   1. At risk for inadequate oral intake related to confusion as evidence by MD note    NUTRITION INTERVENTIONS:   INTERVENTIONS:        GOALS:  1. Supplement: Deangelo BID  Add MVI and Vit C 500mg x 10d 1. Encourage PO intake >50% at all meals by next review 4 days     LEARNING NEEDS (Diet, Supplementation, Food/Nutrient-Drug Interaction):   [x] None Identified  [] Inpatient education provided/documented    [] Identified and patient:  [] Declined     [] Was not appropriate/indicated  NUTRITION MONITORING /EVALUATION:   Follow PO intake  Monitor wt  Monitor renal labs, electrolytes, fluid status  Monitor for additional supplement needs    [] Participated in Interdisciplinary Rounds  [x] [address] [place] [address] Reviewed/Documented  DISCHARGE NUTRITION RECOMMENDATIONS ADDRESSED:        [x] To be determined closer to discharge    NUTRITION RISK:     [x]  At risk                     []  Not currently at risk     Will follow-up per policy.   Betsy Xavier 1

## 2019-05-31 NOTE — PROGRESS NOTES
Transition of care:   Patients daughter not at bedside at this time. Cm has sent message to [facility] to assess patient for medicaid or FAA. Patient remains confused and is not ready for d/c. Cm has sent out to facilities but none have accepted and she also would have to have authorization from insurance. Once she has accepting facility. Cm will continue to follow  Care Management Interventions  PCP Verified by CM: Yes  Transition of Care Consult (CM Consult): Discharge Planning  Current Support Network: Relative's Home  Confirm Follow Up Transport: Family  Plan discussed with Pt/Family/Caregiver: Yes  Freedom of Choice Offered:  Yes

## 2019-05-31 NOTE — PROGRESS NOTES
2047 The patient's daughter is at the bedside and is translating for the patient who speaks Singaporean. She states that the patient is very confused and is oriented to nothing. The patient is repeating sounds over and over and is restless in bed. No obvious weakness in her extremities. She does not follow instructions from her daughter which makes assessing the patient difficult. A sitter is at the bedside for the patient's safety. 2301 Administered evening medications as ordered. Pt was given oral medications crushed in applesauce. The patient has an open area on her left and right buttocks both about the size of a quarter. A mepilex was placed over both as well as a mepilex over her sacrum for protection. [id number] Shift Summary: The patient was rested at times but other times was quite restless. A sitter stayed at the bedside at all times for safety. No new clinical concerns noted.

## 2019-05-31 NOTE — PROGRESS NOTES
Problem: Pressure Injury - Risk of  Goal: *Prevention of pressure injury  Description  Document Theodore Scale and appropriate interventions in the flowsheet. Outcome: Progressing Towards Goal     Problem: Patient Education: Go to Patient Education Activity  Goal: Patient/Family Education  Outcome: Progressing Towards Goal     Problem: Falls - Risk of  Goal: *Absence of Falls  Description  Document Tressia Bullion Fall Risk and appropriate interventions in the flowsheet.   Outcome: Progressing Towards Goal     Problem: Patient Education: Go to Patient Education Activity  Goal: Patient/Family Education  Outcome: Progressing Towards Goal     Problem: Patient Education: Go to Patient Education Activity  Goal: Patient/Family Education  Outcome: Progressing Towards Goal

## 2019-05-31 NOTE — PROGRESS NOTES
NEUROLOGY PROGRESS NOTE        Patient: Maria De Jesus Frausto        Sex: female          DOA: 5/29/2019  YOB: 1945      Age:  69 y.o.         LOS: 2 days     Identification:  69-[facility] female presents with acute altered mental status after a fall. SUBJECTIVE:   Patient is lying in bed today, restless. Her daughter is not in the room this morning. REVIEW OF SYSTEMS: Denies chest pain, abdominal pain, nausea or vomiting. No fever or chills. OBJECTIVE:      Visit Vitals  /74   Pulse 88   Temp 97.4 °F (36.3 °C)   Resp 18   Ht 5' 2\" (1.575 m)   Wt 63.5 kg (140 lb)   SpO2 98%   BMI 25.61 kg/m²     Physical Exam:  GENERAL: Confused, trying to get out of the bed  HEENT: Moist mucous membranes, sclerae anicteric, scalp is atraumatic. CVS: Regular rate and rhythm, no murmurs or gallops. No carotid bruits. PULMONARY: Clear to auscultation bilaterally. No rales or rhonchi. No wheezing. EXTREMITIES: Normal range of motion at all sites. No deformities. ABDOMEN: Soft, nontender. SKIN: No rashes or ecchymoses. Warm and dry. NEUROLOGIC: Confused, trying to get out of her bed. She does not follow verbal commands. She states her name but then she has echolalia. Says \"no\" and 'yes' repeatedly. Face looks symmetric. She moves all the extremities irregularly. Left arm moves upo and right arm moves circular at times. She sticks her tongue at times. There is no regular pattern to her movements. They do not look chorieform, though at times, it is choreiform. She withdraws from noxious stimulus at all sites.  Reflexes are difficult to assess        Current Facility-Administered Medications   Medication Dose Route Frequency    melatonin tablet 5 mg  5 mg Oral QHS    valproate (DEPACON) 500 mg in 0.9% sodium chloride 50 mL IVPB  500 mg IntraVENous BID    cefTRIAXone (ROCEPHIN) 1 g in sterile water (preservative free) 10 mL IV syringe  1 g IntraVENous Q24H    atorvastatin (LIPITOR) tablet 20 mg  20 mg Oral DAILY    sodium chloride (NS) flush 5-40 mL  5-40 mL IntraVENous Q8H    sodium chloride (NS) flush 5-40 mL  5-40 mL IntraVENous PRN    ondansetron (ZOFRAN) injection 4 mg  4 mg IntraVENous Q6H PRN    aspirin chewable tablet 81 mg  81 mg Oral DAILY    acetaminophen (TYLENOL) tablet 650 mg  650 mg Oral Q4H PRN    enoxaparin (LOVENOX) injection 40 mg  40 mg SubCUTAneous Q24H       Laboratory  Recent Results (from the past 24 hour(s))   GLUCOSE, POC    Collection Time: 05/30/19 11:34 AM   Result Value Ref Range    Glucose (POC) 106 70 - 110 mg/dL   ECHO ADULT COMPLETE    Collection Time: 05/30/19  1:55 PM   Result Value Ref Range    AO ASC D 2.41 cm    Aortic Valve Systolic Peak Velocity 298.14 cm/s    AoV VTI 39.46 cm    Aortic Valve Area by Continuity of Peak Velocity 2.4 cm2    Aortic Valve Area by Continuity of VTI 2.7 cm2    AoV PG 12.9 mmHg    LVIDd 4.11 3.9 - 5.3 cm    LVPWd 0.97 (A) 0.6 - 0.9 cm    LVIDs 2.83 cm    IVSd 1.49 (A) 0.6 - 0.9 cm    LV ED Vol A2C 66.1 mL    LV ES Vol A4C 19.0 mL    LV ES Vol BP 23.7 19 - 49 mL    LVOT d 1.97 cm    LVOT Peak Velocity 143.87 cm/s    LVOT Peak Gradient 8.3 mmHg    LVOT VTI 34.65 cm    LV E' Septal Velocity 5.00 cm/s    LV E' Lateral Velocity 7.00 cm/s    MVA (PHT) 3.0 cm2    MV A Johnny 93.93 cm/s    MV E Johnny 84.22 cm/s    MV E/A 0.90     RVIDd 3.29 cm    Aortic Valve Systolic Mean Gradient 6.8 mmHg    BP EF 58.7 55 - 100 %    LV Ejection Fraction MOD 4C 59 %    LV Ejection Fraction MOD 2C 61 %    LA Vol 4C 42.53 22 - 52 mL    LA Vol 2C 33.71 22 - 52 mL    LV Mass .5 (A) 67 - 162 g    LV Mass AL Index 127.5 (A) 43 - 95 g/m2    E/E' lateral 12.03     E/E' septal 16.84     TAPSV 1.5 cm/s    IVC proximal 0.57 cm    E/E' ratio (averaged) 14.44     LV ES Vol A2C 25.9 mL    LVES Vol Index BP 14.4 mL/m2    LV ED Vol A4C 46.1 mL    LVED Vol Index BP 34.9 mL/m2    Mitral Valve E Wave Deceleration Time 257.0 ms    Mitral Valve Pressure Half-time 74.5 ms    Left Atrium Major Axis 2.59 cm    Triscuspid Valve Regurgitation Peak Gradient 21.6 mmHg    Pulmonic Valve Max Velocity 99.31 cm/s    LV ED Vol BP 57.4 56 - 104 ml    TR Max Velocity 232.17 cm/s    PASP 26.0 mmHg    LA Vol Index 20.52 16 - 28 ml/m2    LA Vol Index 25.89 16 - 28 ml/m2    LVED Vol Index A4C 28.1 mL/m2    LVED Vol Index A2C 40.2 mL/m2    LVES Vol Index A4C 11.6 mL/m2    LVES Vol Index A2C 15.8 mL/m2    MOISES/BSA Pk Johnny 1.5 cm2/m2    MOISES/BSA VTI 1.6 cm2/m2    PV peak gradient 3.9 mmHg    LA Volume 41.28 22 - 52 mL    Ao Root D 3.08 cm    LA Vol Index 25.13 16 - 28 ml/m2   GLUCOSE, POC    Collection Time: 05/30/19  4:30 PM   Result Value Ref Range    Glucose (POC) 114 (H) 70 - 146 mg/dL   METABOLIC PANEL, COMPREHENSIVE    Collection Time: 05/31/19  2:55 AM   Result Value Ref Range    Sodium 141 136 - 145 mmol/L    Potassium 3.7 3.5 - 5.5 mmol/L    Chloride 107 100 - 108 mmol/L    CO2 25 21 - 32 mmol/L    Anion gap 9 3.0 - 18 mmol/L    Glucose 90 74 - 99 mg/dL    BUN 14 7.0 - 18 MG/DL    Creatinine 0.98 0.6 - 1.3 MG/DL    BUN/Creatinine ratio 14 12 - 20      GFR est AA >60 >60 ml/min/1.73m2    GFR est non-AA 56 (L) >60 ml/min/1.73m2    Calcium 8.7 8.5 - 10.1 MG/DL    Bilirubin, total 0.6 0.2 - 1.0 MG/DL    ALT (SGPT) 30 13 - 56 U/L    AST (SGOT) 23 15 - 37 U/L    Alk.  phosphatase 90 45 - 117 U/L    Protein, total 7.6 6.4 - 8.2 g/dL    Albumin 3.4 3.4 - 5.0 g/dL    Globulin 4.2 (H) 2.0 - 4.0 g/dL    A-G Ratio 0.8 0.8 - 1.7     CBC W/O DIFF    Collection Time: 05/31/19  2:55 AM   Result Value Ref Range    WBC 9.6 4.6 - 13.2 K/uL    RBC 3.70 (L) 4.20 - 5.30 M/uL    HGB 11.2 (L) 12.0 - 16.0 g/dL    HCT 33.7 (L) 35.0 - 45.0 %    MCV 91.1 74.0 - 97.0 FL    MCH 30.3 24.0 - 34.0 PG    MCHC 33.2 31.0 - 37.0 g/dL    RDW 12.9 11.6 - 14.5 %    PLATELET 603 778 - 441 K/uL    MPV 9.6 9.2 - 11.8 FL       Radiology:  Mri Brain Wo Cont    Result Date: 5/29/2019  Brain MR without contrast: Indication: History of fall 3 days previous from wheelchair. Uncontrollable movements right upper extremity and decreased level of consciousness with increased confusion. Procedure: Sagittal spin echo T1, axial FSE FLAIR and T2 and axial diffusion weighted scanning was performed. An axial T2* scan optimized to detect hemosiderin and calcium was performed. Coronal spin echo T1and FSE T2 scans were also performed. No contrast was administered. Comparison exam: Head CT 5/29/2019. Findings: Diffusion: There are no areas of restricted diffusion, no acute infarction. The T2* scan shows no acute or chronic hemorrhage or abnormal calcifications. Brain parenchyma: [Substantial ex vacuo dilatation of the left lateral ventricle related to a likely chronic infarction in the superior medial left frontal lobe, an area of focal atrophy with substantial subcortical cystic encephalomalacia and some gliosis. Additional areas of in part fluid signal likely chronic lacunar infarction anterior limb internal capsule on the left and in the right thalamus are noted.] Confluent ischemic changes immediate periventricular white matter. There is substantial diffuse atrophy of the body of the corpus callosum. Likely areas of chronic infarction of bilateral paramedian upper anterior genu and bilateral body of the corpus callosum. The rostrum and splenium are present and relatively intact. No mass or mass effect. There is a right paracentral fluid signal lesion in the mid maged likely an area of chronic lacunar infarction. Likely fluid signal chronic area of infarction in the immediate periventricular right parietal lobe in part communicating with the lateral ventricle. Ventricles and sulci: Mild prominence of sulci in the perisylvian region. The temporal tips are mildly prominent. Extra axial: No extra axial fluid collection or mass. Brain vasculature: Normal, no arterial vascular abnormality is noted.  Craniocervical Junction: Normal. Extracranial and skull base:  Mucosal thickening in the paranasal sinuses. No air-fluid level. Impression: 1. No acute hemorrhage or acute infarction. No evidence of significant acute brain injury. 2. Substantial chronic infarction in superior medial left frontal lobe. Likely extensive chronic bilateral corpus callosum infarction. Chronic lacunar infarctions in the left anterior limb internal capsule, right thalamus, right parietal periventricular white matter and right paramedian maged. 3. Atrophy as described with ischemic white matter change. Ct Head Wo Cont    Result Date: 5/29/2019  EXAM: CT head INDICATION: Unwitnessed fall, increasing agitation. COMPARISON: None. TECHNIQUE: Axial CT imaging of the head was performed without intravenous contrast. One or more dose reduction techniques were used on this CT: automated exposure control, adjustment of the mAs and/or kVp according to patient size, and iterative reconstruction techniques. The specific techniques used on this CT exam have been documented in the patient's electronic medical record. Digital Imaging and Communications in Medicine (DICOM) format image data are available to nonaffiliated external healthcare facilities or entities on a secure, media free, reciprocally searchable basis with patient authorization for at least a 12-month period after this study. _______________ FINDINGS: BRAIN AND POSTERIOR FOSSA: There is mild cortical and cerebellar volume loss present. There is ex vacuo dilatation of the left lateral ventricle related to adjacent areas of encephalomalacia within the left frontal lobe and left centrum semiovale basilar cisterns are patent. Subcortical and periventricular white matter low-attenuation is present. Bilateral physiologic basal ganglia calcifications. There is no intracranial hemorrhage, mass effect, or midline shift. Gray-white matter differentiation is within normal limits. EXTRA-AXIAL SPACES AND MENINGES: There are no abnormal extra-axial fluid collections. CALVARIUM: Intact. SINUSES: Imaged paranasal sinuses and mastoid air cells are clear. OTHER: None. _______________     IMPRESSION: 1. No acute intracranial abnormality. 2. Evidence of prior left frontal lobe infarcts with associated ex vacuo dilatation of the anterior horn of the left lateral ventricle. 3. Subcortical and periventricular white matter low-attenuation; favored to reflect sequela of chronic ischemic microvascular change. Cta Head Neck W Wo Cont    Addendum Date: 5/29/2019    Addendum: 4. Somewhat irregular small right upper lobe lung nodule. This could be an area of scarring. Chest CT for correlation is suggested. Result Date: 5/29/2019  Brain CT angiogram and Neck CT angiogram: Indication: Evaluate for stenosis. Possible infarction. Evaluate for source of embolus. History of recent fall with possible head injury. Uncontrolled subsequent movements of her right upper extremity and decreased level of consciousness with increased confusion. Not certain as to the reason why brain and neck vascular imaging was not performed at the time of brain MRI performed earlier. Procedure: Neck CT angiogram: Contrast was administered with a power injector. Axial thin section volume acquisition  scans were obtained timed for peak arterial enhancement. An automated triggering system was used. Axial images were generated. Angiographic coronal and sagittal reformations were also generated. Extensive 3-D separate workstation processing was performed by [employer]. Brain CT angiogram: Dedicated slab MIP overlapping angiographic reconstructions in the sagittal, axial and coronal plane of the brain component of the axial evaluation were also performed. Extensive 3-D separate workstation processing was performed by [employer].  One or more dose reduction techniques were used on this CT: automated exposure control, adjustment of the mAs and/or kVp according to patient size, and iterative reconstruction techniques. The specific techniques used on this CT exam have been documented in the patient's electronic medical record. Digital Imaging and Communications in Medicine (DICOM) format image data are available to nonaffiliated external healthcare facilities or entities on a secure, media free, reciprocally searchable basis with patient authorization for at least a 12-month period after this study. Comparison exam: Head CT and brain MRI 5/29/2019. Findings: Neck CTA: Any internal carotid stenosis described below uses NASCET criteria, minimal residual lumen diameter versus the non-tapering cervical internal carotid artery. Aortic Arch: Normal branching pattern. No proximal great vessel stenosis. Some ulcerative changes along the aortic arch without large aneurysm or definitive high-grade stenosis. Left carotid: No stenosis or other vascular abnormality. Right carotid:  Mild calcified plaque carotid bulb but no substantial diameter stenosis or other vascular abnormality. The vertebral arteries are left dominant. Right vertebral artery:  Is characterized by calcified subclavian plaque adjacent to the origin without significant stenosis of the right vertebral which is patent to the basilar. Left vertebral artery:  No stenosis or other vascular abnormality. Lung apices: There is a small nodule in the right lung apex measuring 3 mm with minimal linear density, possibly chronic scarring. On the coronal reformations the lesion has a potentially mildly spiculated appearance and measures 4 mm. No other suspicious lung nodule. Neck soft tissues: Lobulated prominence of the lingual tonsils and prominence of the palatine tonsils are symmetric likely reactive. No definite invasive mass or pathologic adenopathy with small likely reactive lymph nodes bilaterally. Brain CTA: Carotid siphon and supraclinoid internal carotid artery: There is some calcified plaque in the carotid siphon but no definitive high-grade siphon stenosis.  Mild stenosis of the junction of the cavernous and supraclinoid internal carotid on the right, felt to be less than 50%. There is a suspicion of a small contour abnormality projecting anterior of the anterior aspect of the right anterior cavernous carotid loop projecting anterior and just lateral, clearly intracavernous but potentially a tiny aneurysm very wide neck measuring 2 mm. No other supraclinoid internal carotid artery aneurysm or stenosis. M1 segment and proximal M2 segment MCA:  Mild-moderate mid right M1 segment stenosis with some irregularity of contour but no definitive high-grade stenosis substantial stenosis origin inferior division right M2 segment, clearly greater than 50%. No left M1 or proximal M2 segment stenosis. . A1 segment, anterior communicating artery and proximal A2 segments:  No significant stenosis or aneurysm. Vertebrobasilar system:  Variant anatomy is elongated V4 segment right vertebral artery with a very distal vertebral basilar junction. Dominant left vertebral. Fenestration proximal V4 segment right vertebral without associated aneurysm. Mild irregularity left P2 segment PCA. No definite high-grade PCA stenosis. Distal anterior cerebral artery:  Multifocal areas of anterior cerebral artery pericallosal artery stenosis particularly of the smaller left pericallosal artery with an area of severe stenosis just anterior to the anterior genu of the corpus callosum but multifocal disease. Of note there is a chronic substantial left ZOLTAN territory infarction in the distal left ZOLTAN is a very small vessel. Distal MCA M2/M3 segment:  There are some areas of distal MCA stenosis, multifocal consistent with additional areas of intracranial atherosclerosis. No other significant vascular abnormality.  Brain parenchyma on source data:  No new parenchymal brain abnormality other than what was previously noted on brain MRI with a substantial chronic superior medial left hemispheric ZOLTAN territory infarction and additional chronic small vessel infarctions. Impression: 1. No hemodynamically significant cervical vascular stenosis. 2. Evidence of intracranial atherosclerosis. Stenosis is most severe involving the left ZOLTAN likely associated with the large chronic left ZOLTAN territory infarction as well as inferior division right M2 segment MCA. Additional more distal MCA and ZOLTAN stenosis as described above. Less severe potentially moderate stenosis mid right M1 segment MCA. 3. 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 ASSESSMENT/IMPRESSION:   74-year-old female with past medical history of left frontal lobe stroke, and hyperlipidemia, presents with acute encephalopathy after a fall. Physical exam is unremarkable except that patient is disoriented, and confused. CT head ruled out acute abnormality, EEG is severely contaminated by EMG artifacts, there may be some transient sharp waves. 1. Acute encephalopathy: metabolic versus delirium in the setting of possible baseline dementia. Depakote was started yesterday, however she remains to be confused and restless. I doubt that the acute encephalopathy is caused by complex partial seizure.   2. History of left frontal stroke. PLAN/RECOMMENDATIONS:  1. Repeat urinalysis, chest x-ray, UDS to rule out underlying infection. 2. Seroquel 25 mg qhs for symptomatic treatment  3. Frequent reorientation at best side. I will follow the patient. Please do not hesitate to return with any questions. Signed:   Simona Young MD  5/31/2019  8:27 AM

## 2019-05-31 NOTE — PROGRESS NOTES
Problem: Falls - Risk of  Goal: *Absence of Falls  Description  Document Joan Trevorton Fall Risk and appropriate interventions in the flowsheet.   Outcome: Progressing Towards Goal     Problem: Patient Education: Go to Patient Education Activity  Goal: Patient/Family Education  Outcome: Progressing Towards Goal     Problem: Patient Education: Go to Patient Education Activity  Goal: Patient/Family Education  Outcome: Progressing Towards Goal

## 2019-06-01 LAB
ALBUMIN SERPL-MCNC: 3.1 G/DL (ref 3.4–5)
ALBUMIN/GLOB SERPL: 0.8 {RATIO} (ref 0.8–1.7)
ALP SERPL-CCNC: 85 U/L (ref 45–117)
ALT SERPL-CCNC: 29 U/L (ref 13–56)
ANION GAP SERPL CALC-SCNC: 10 MMOL/L (ref 3–18)
AST SERPL-CCNC: 22 U/L (ref 15–37)
BILIRUB SERPL-MCNC: 0.6 MG/DL (ref 0.2–1)
BUN SERPL-MCNC: 12 MG/DL (ref 7–18)
BUN/CREAT SERPL: 12 (ref 12–20)
CALCIUM SERPL-MCNC: 8.7 MG/DL (ref 8.5–10.1)
CHLORIDE SERPL-SCNC: 106 MMOL/L (ref 100–108)
CO2 SERPL-SCNC: 26 MMOL/L (ref 21–32)
CREAT SERPL-MCNC: 1 MG/DL (ref 0.6–1.3)
ERYTHROCYTE [DISTWIDTH] IN BLOOD BY AUTOMATED COUNT: 12.9 % (ref 11.6–14.5)
GLOBULIN SER CALC-MCNC: 3.8 G/DL (ref 2–4)
GLUCOSE SERPL-MCNC: 100 MG/DL (ref 74–99)
HCT VFR BLD AUTO: 32.7 % (ref 35–45)
HGB BLD-MCNC: 10.8 G/DL (ref 12–16)
MCH RBC QN AUTO: 29.8 PG (ref 24–34)
MCHC RBC AUTO-ENTMCNC: 33 G/DL (ref 31–37)
MCV RBC AUTO: 90.1 FL (ref 74–97)
PLATELET # BLD AUTO: 247 K/UL (ref 135–420)
PMV BLD AUTO: 10 FL (ref 9.2–11.8)
POTASSIUM SERPL-SCNC: 3.3 MMOL/L (ref 3.5–5.5)
PROT SERPL-MCNC: 6.9 G/DL (ref 6.4–8.2)
RBC # BLD AUTO: 3.63 M/UL (ref 4.2–5.3)
SODIUM SERPL-SCNC: 142 MMOL/L (ref 136–145)
WBC # BLD AUTO: 7.6 K/UL (ref 4.6–13.2)

## 2019-06-01 PROCEDURE — 85027 COMPLETE CBC AUTOMATED: CPT

## 2019-06-01 PROCEDURE — 74011250637 HC RX REV CODE- 250/637: Performed by: FAMILY MEDICINE

## 2019-06-01 PROCEDURE — 74011250636 HC RX REV CODE- 250/636: Performed by: HOSPITALIST

## 2019-06-01 PROCEDURE — 74011250637 HC RX REV CODE- 250/637: Performed by: PSYCHIATRY & NEUROLOGY

## 2019-06-01 PROCEDURE — 36415 COLL VENOUS BLD VENIPUNCTURE: CPT

## 2019-06-01 PROCEDURE — 77030011256 HC DRSG MEPILEX <16IN NO BORD MOLN -A

## 2019-06-01 PROCEDURE — 77030037877 HC DRSG MEPILEX >48IN BORD MOLN -A

## 2019-06-01 PROCEDURE — 74011250637 HC RX REV CODE- 250/637: Performed by: HOSPITALIST

## 2019-06-01 PROCEDURE — 84439 ASSAY OF FREE THYROXINE: CPT

## 2019-06-01 PROCEDURE — 80053 COMPREHEN METABOLIC PANEL: CPT

## 2019-06-01 PROCEDURE — 84443 ASSAY THYROID STIM HORMONE: CPT

## 2019-06-01 PROCEDURE — 65660000000 HC RM CCU STEPDOWN

## 2019-06-01 PROCEDURE — 74011000250 HC RX REV CODE- 250: Performed by: HOSPITALIST

## 2019-06-01 RX ADMIN — MULTIPLE VITAMINS W/ MINERALS TAB 1 TABLET: TAB at 09:27

## 2019-06-01 RX ADMIN — ACETAMINOPHEN 650 MG: 325 TABLET ORAL at 09:37

## 2019-06-01 RX ADMIN — Medication 500 MG: at 09:27

## 2019-06-01 RX ADMIN — Medication 10 ML: at 22:14

## 2019-06-01 RX ADMIN — ENOXAPARIN SODIUM 40 MG: 40 INJECTION SUBCUTANEOUS at 13:55

## 2019-06-01 RX ADMIN — Medication 5 MG: at 22:13

## 2019-06-01 RX ADMIN — Medication 10 ML: at 14:00

## 2019-06-01 RX ADMIN — ASPIRIN 81 MG 81 MG: 81 TABLET ORAL at 09:27

## 2019-06-01 RX ADMIN — Medication 10 ML: at 05:44

## 2019-06-01 RX ADMIN — ATORVASTATIN CALCIUM 20 MG: 20 TABLET, FILM COATED ORAL at 09:27

## 2019-06-01 RX ADMIN — QUETIAPINE FUMARATE 25 MG: 25 TABLET ORAL at 22:13

## 2019-06-01 RX ADMIN — CEFTRIAXONE 1 G: 1 INJECTION, POWDER, FOR SOLUTION INTRAMUSCULAR; INTRAVENOUS at 11:42

## 2019-06-01 NOTE — PROGRESS NOTES
Hospitalist Progress Note    Patient: Rob Pinon MRN: [id number]  CSN: [id number]    YOB: 1945  Age: 69 y.o. Sex: female    DOA: 5/29/2019 LOS:  LOS: 3 days            Assessment/Plan     Active Problems:    Hemiballismus (5/29/2019)      Confusion (5/29/2019)      Hypertension (5/29/2019)      Wheelchair bound (5/29/2019)      Fall at home, sequela (5/29/2019)      History of stroke (5/29/2019)       Right arm abnormal movements-still happening, but no clear etiology other than prior CVA changes, none acute seen     Encephalopathy/confusion, may be due to vascular dementia, plus recent fall, no clear seizures occurring or by EEG-seroquel started by neurology. Discussed the case with Dr. Jimbo Lee.     Possible UTI-start rocephin for 3 days empirically-bact on UA, but no leuk-get cath urine, CXR result pending     History of stroke, extensive left sided disease     Hypertension: Controlled      Wheelchair dependence due to prior hip injury.     Watch mental status, high risks for fall    PT as tolerated if able to cooperate     Discussed with daughter who was at the bedside     Goal-SNF on d/c PENDING       CC: A 70 yo  female with hx of CVA and wheelchair dependence came with abnormal arm movements and confusion  she had a Fall at home. Subjective:     Pt was seen and examined with the nurse in the morning round. Still confused and having intermittent abnormal right arm movement. Knows that she is in [place]. Not agitate. Her daughter was at the bedside. Review of systems  Limited 2nd to confusion. Objective:      Visit Vitals  /81 (BP 1 Location: Left arm, BP Patient Position: At rest;Supine)   Pulse 79   Temp 98.2 °F (36.8 °C)   Resp 18   Ht 5' 2\" (1.575 m)   Wt 63.5 kg (140 lb)   SpO2 100%   BMI 25.61 kg/m²       Physical Exam:    Gen: NAD, non-toxic. Heent:  MMM, NC, AT. Cor: s1s2 RRR. No MRG. PMI mid 5th intercostal space. Resp:  CTA b/l. No w/r/r.   Nml effort and diaphragmatic excursion. Abd:  NT ND.  BS positive. No rebound or guarding. No masses. Ext: No edema or cyanosis. Intake and Output:  Current Shift:  No intake/output data recorded. Last three shifts:  05/30 1901 - 06/01 0700  In: 700 [P.O.:700]  Out: 0     Labs: Results:       Chemistry Recent Labs     06/01/19 0313 05/31/19  0255 05/30/19  0555   * 90 89    141 145   K 3.3* 3.7 3.8    107 111*   CO2 26 25 25   BUN 12 14 23*   CREA 1.00 0.98 1.04   CA 8.7 8.7 8.4*   AGAP 10 9 9   BUCR 12 14 22*   AP 85 90 75   TP 6.9 7.6 7.0   ALB 3.1* 3.4 3.2*   GLOB 3.8 4.2* 3.8   AGRAT 0.8 0.8 0.8      CBC w/Diff Recent Labs     06/01/19 0313 05/31/19  0255 05/30/19  0555 05/29/19  1150   WBC 7.6 9.6 8.5 9.1   RBC 3.63* 3.70* 3.57* 3.77*   HGB 10.8* 11.2* 10.5* 11.2*   HCT 32.7* 33.7* 33.5* 34.8*    246 271 290   GRANS  --   --   --  61   LYMPH  --   --   --  20*   EOS  --   --   --  12*      Cardiac Enzymes Recent Labs     05/29/19  1150   *   CKND1 1.3      Coagulation Recent Labs     05/29/19  1150   PTP 12.6   INR 1.0   APTT 26.7       Lipid Panel Lab Results   Component Value Date/Time    Cholesterol, total 111 05/30/2019 05:55 AM    HDL Cholesterol 37 (L) 05/30/2019 05:55 AM    LDL, calculated 50.4 05/30/2019 05:55 AM    VLDL, calculated 23.6 05/30/2019 05:55 AM    Triglyceride 118 05/30/2019 05:55 AM    CHOL/HDL Ratio 3.0 05/30/2019 05:55 AM      BNP No results for input(s): BNPP in the last 72 hours.    Liver Enzymes Recent Labs     06/01/19  0313   TP 6.9   ALB 3.1*   AP 85   SGOT 22      Thyroid Studies No results found for: T4, T3U, TSH, TSHEXT     Procedures/imaging: see electronic medical records for all procedures/Xrays and details which were not copied into this note but were reviewed prior to creation of Plan      Medications Reviewed  Vanessa Giordano MD

## 2019-06-01 NOTE — PROGRESS NOTES
0745:  Assumed care for patient, received bedside report from Papo Ponce RN. Patient lying in bed quietly resting with no complaints of pain or discomfort at the time. Patient daughter at bedside. Whiteboard updated, bed at the lowest position with call bell within reach. 0930:  Dr. Madelin Downs at beside. Shift uneventful. Bedside and Verbal shift change report given to Papo Ponce RN (oncoming nurse) by Dotty Ziegler RN   (offgoing nurse). Report included the following information SBAR, Kardex, ED Summary, Procedure Summary, Intake/Output, MAR, Recent Results, Med Rec Status, Cardiac Rhythm SR and Alarm Parameters .

## 2019-06-01 NOTE — PROGRESS NOTES
NEUROLOGY PROGRESS NOTE        Patient: Kraig Francis        Sex: female          DOA: 5/29/2019  YOB: 1945      Age:  69 y.o.         LOS: 3 days     Identification:  [id number] female presents with acute altered mental status after a fall. SUBJECTIVE:   Patient is lying in bed today. She seems to be less restless and able to say that she is in hospital.  Her daughter was in the room this morning, who says she is the same. REVIEW OF SYSTEMS: Denies chest pain, abdominal pain, nausea or vomiting. No fever or chills. OBJECTIVE:      Visit Vitals  /84 (BP 1 Location: Left arm, BP Patient Position: At rest;Supine)   Pulse 72   Temp 97.4 °F (36.3 °C)   Resp 18   Ht 5' 2\" (1.575 m)   Wt 63.5 kg (140 lb)   SpO2 99%   BMI 25.61 kg/m²     Physical Exam:  GENERAL: Confused, trying to get out of the bed  HEENT: Moist mucous membranes, sclerae anicteric, scalp is atraumatic. CVS: Regular rate and rhythm, no murmurs or gallops. No carotid bruits. PULMONARY: Clear to auscultation bilaterally. No rales or rhonchi. No wheezing. EXTREMITIES: Normal range of motion at all sites. No deformities. ABDOMEN: Soft, nontender. SKIN: No rashes or ecchymoses. Warm and dry. NEUROLOGIC:She  follows very simple commands. She talks to daughter in [facility]. She states her name and hospital. She can tell me that she is not feeling good. She gestured and asked for towel. There is no regular pattern to her movements. They do not look chorieform, though at times, it is choreiform. She withdraws from noxious stimulus at all sites.        Current Facility-Administered Medications   Medication Dose Route Frequency    QUEtiapine (SEROquel) tablet 25 mg  25 mg Oral QHS    multivitamin, tx-iron-ca-min (THERA-M w/ IRON) tablet 1 Tab  1 Tab Oral DAILY    ascorbic acid (vitamin C) (VITAMIN C) tablet 500 mg  500 mg Oral DAILY    melatonin tablet 5 mg  5 mg Oral QHS    cefTRIAXone (ROCEPHIN) 1 g in sterile water (preservative free) 10 mL IV syringe  1 g IntraVENous Q24H    atorvastatin (LIPITOR) tablet 20 mg  20 mg Oral DAILY    sodium chloride (NS) flush 5-40 mL  5-40 mL IntraVENous Q8H    sodium chloride (NS) flush 5-40 mL  5-40 mL IntraVENous PRN    ondansetron (ZOFRAN) injection 4 mg  4 mg IntraVENous Q6H PRN    aspirin chewable tablet 81 mg  81 mg Oral DAILY    acetaminophen (TYLENOL) tablet 650 mg  650 mg Oral Q4H PRN    enoxaparin (LOVENOX) injection 40 mg  40 mg SubCUTAneous Q24H       Laboratory  Recent Results (from the past 24 hour(s))   URINALYSIS W/MICROSCOPIC    Collection Time: 05/31/19  5:44 PM   Result Value Ref Range    Color YELLOW      Appearance CLEAR      Specific gravity 1.012 1.005 - 1.030      pH (UA) 6.0 5.0 - 8.0      Protein 30 (A) NEG mg/dL    Glucose NEGATIVE  NEG mg/dL    Ketone NEGATIVE  NEG mg/dL    Bilirubin NEGATIVE  NEG      Blood NEGATIVE  NEG      Urobilinogen 0.2 0.2 - 1.0 EU/dL    Nitrites NEGATIVE  NEG      Leukocyte Esterase NEGATIVE  NEG      WBC NEGATIVE  0 - 5 /hpf    RBC NEGATIVE  0 - 5 /hpf    Epithelial cells FEW 0 - 5 /lpf    Bacteria NEGATIVE  NEG /hpf   DRUG SCREEN, URINE    Collection Time: 05/31/19  5:44 PM   Result Value Ref Range    BENZODIAZEPINES NEGATIVE  NEG      BARBITURATES NEGATIVE  NEG      THC (TH-CANNABINOL) NEGATIVE  NEG      OPIATES NEGATIVE  NEG      PCP(PHENCYCLIDINE) NEGATIVE  NEG      COCAINE NEGATIVE  NEG      AMPHETAMINES NEGATIVE  NEG      METHADONE NEGATIVE  NEG      HDSCOM (NOTE)    CBC W/O DIFF    Collection Time: 06/01/19  3:13 AM   Result Value Ref Range    WBC 7.6 4.6 - 13.2 K/uL    RBC 3.63 (L) 4.20 - 5.30 M/uL    HGB 10.8 (L) 12.0 - 16.0 g/dL    HCT 32.7 (L) 35.0 - 45.0 %    MCV 90.1 74.0 - 97.0 FL    MCH 29.8 24.0 - 34.0 PG    MCHC 33.0 31.0 - 37.0 g/dL    RDW 12.9 11.6 - 14.5 %    PLATELET 241 205 - 983 K/uL    MPV 10.0 9.2 - 26.7 FL   METABOLIC PANEL, COMPREHENSIVE    Collection Time: 06/01/19  3:13 AM   Result Value Ref Range Sodium 142 136 - 145 mmol/L    Potassium 3.3 (L) 3.5 - 5.5 mmol/L    Chloride 106 100 - 108 mmol/L    CO2 26 21 - 32 mmol/L    Anion gap 10 3.0 - 18 mmol/L    Glucose 100 (H) 74 - 99 mg/dL    BUN 12 7.0 - 18 MG/DL    Creatinine 1.00 0.6 - 1.3 MG/DL    BUN/Creatinine ratio 12 12 - 20      GFR est AA >60 >60 ml/min/1.73m2    GFR est non-AA 55 (L) >60 ml/min/1.73m2    Calcium 8.7 8.5 - 10.1 MG/DL    Bilirubin, total 0.6 0.2 - 1.0 MG/DL    ALT (SGPT) 29 13 - 56 U/L    AST (SGOT) 22 15 - 37 U/L    Alk. phosphatase 85 45 - 117 U/L    Protein, total 6.9 6.4 - 8.2 g/dL    Albumin 3.1 (L) 3.4 - 5.0 g/dL    Globulin 3.8 2.0 - 4.0 g/dL    A-G Ratio 0.8 0.8 - 1.7         Radiology:  Mri Brain Wo Cont    Result Date: 5/29/2019  Brain MR without contrast: Indication: History of fall 3 days previous from wheelchair. Uncontrollable movements right upper extremity and decreased level of consciousness with increased confusion. Procedure: Sagittal spin echo T1, axial FSE FLAIR and T2 and axial diffusion weighted scanning was performed. An axial T2* scan optimized to detect hemosiderin and calcium was performed. Coronal spin echo T1and FSE T2 scans were also performed. No contrast was administered. Comparison exam: Head CT 5/29/2019. Findings: Diffusion: There are no areas of restricted diffusion, no acute infarction. The T2* scan shows no acute or chronic hemorrhage or abnormal calcifications. Brain parenchyma: [Substantial ex vacuo dilatation of the left lateral ventricle related to a likely chronic infarction in the superior medial left frontal lobe, an area of focal atrophy with substantial subcortical cystic encephalomalacia and some gliosis. Additional areas of in part fluid signal likely chronic lacunar infarction anterior limb internal capsule on the left and in the right thalamus are noted.] Confluent ischemic changes immediate periventricular white matter.  There is substantial diffuse atrophy of the body of the corpus callosum. Likely areas of chronic infarction of bilateral paramedian upper anterior genu and bilateral body of the corpus callosum. The rostrum and splenium are present and relatively intact. No mass or mass effect. There is a right paracentral fluid signal lesion in the mid maged likely an area of chronic lacunar infarction. Likely fluid signal chronic area of infarction in the immediate periventricular right parietal lobe in part communicating with the lateral ventricle. Ventricles and sulci: Mild prominence of sulci in the perisylvian region. The temporal tips are mildly prominent. Extra axial: No extra axial fluid collection or mass. Brain vasculature: Normal, no arterial vascular abnormality is noted. Craniocervical Junction: Normal. Extracranial and skull base:  Mucosal thickening in the paranasal sinuses. No air-fluid level. Impression: 1. No acute hemorrhage or acute infarction. No evidence of significant acute brain injury. 2. Substantial chronic infarction in superior medial left frontal lobe. Likely extensive chronic bilateral corpus callosum infarction. Chronic lacunar infarctions in the left anterior limb internal capsule, right thalamus, right parietal periventricular white matter and right paramedian maged. 3. Atrophy as described with ischemic white matter change. Ct Head Wo Cont    Result Date: 5/29/2019  EXAM: CT head INDICATION: Unwitnessed fall, increasing agitation. COMPARISON: None. TECHNIQUE: Axial CT imaging of the head was performed without intravenous contrast. One or more dose reduction techniques were used on this CT: automated exposure control, adjustment of the mAs and/or kVp according to patient size, and iterative reconstruction techniques. The specific techniques used on this CT exam have been documented in the patient's electronic medical record.   Digital Imaging and Communications in Medicine (DICOM) format image data are available to nonaffiliated external healthcare facilities or entities on a secure, media free, reciprocally searchable basis with patient authorization for at least a 12-month period after this study. _______________ FINDINGS: BRAIN AND POSTERIOR FOSSA: There is mild cortical and cerebellar volume loss present. There is ex vacuo dilatation of the left lateral ventricle related to adjacent areas of encephalomalacia within the left frontal lobe and left centrum semiovale basilar cisterns are patent. Subcortical and periventricular white matter low-attenuation is present. Bilateral physiologic basal ganglia calcifications. There is no intracranial hemorrhage, mass effect, or midline shift. Gray-white matter differentiation is within normal limits. EXTRA-AXIAL SPACES AND MENINGES: There are no abnormal extra-axial fluid collections. CALVARIUM: Intact. SINUSES: Imaged paranasal sinuses and mastoid air cells are clear. OTHER: None. _______________     IMPRESSION: 1. No acute intracranial abnormality. 2. Evidence of prior left frontal lobe infarcts with associated ex vacuo dilatation of the anterior horn of the left lateral ventricle. 3. Subcortical and periventricular white matter low-attenuation; favored to reflect sequela of chronic ischemic microvascular change. Cta Head Neck W Wo Cont    Addendum Date: 5/29/2019    Addendum: 4. Somewhat irregular small right upper lobe lung nodule. This could be an area of scarring. Chest CT for correlation is suggested. Result Date: 5/29/2019  Brain CT angiogram and Neck CT angiogram: Indication: Evaluate for stenosis. Possible infarction. Evaluate for source of embolus. History of recent fall with possible head injury. Uncontrolled subsequent movements of her right upper extremity and decreased level of consciousness with increased confusion. Not certain as to the reason why brain and neck vascular imaging was not performed at the time of brain MRI performed earlier.  Procedure: Neck CT angiogram: Contrast was administered with a power injector. Axial thin section volume acquisition  scans were obtained timed for peak arterial enhancement. An automated triggering system was used. Axial images were generated. Angiographic coronal and sagittal reformations were also generated. Extensive 3-D separate workstation processing was performed by [facility]. Brain CT angiogram: Dedicated slab MIP overlapping angiographic reconstructions in the sagittal, axial and coronal plane of the brain component of the axial evaluation were also performed. Extensive 3-D separate workstation processing was performed by [facility]. One or more dose reduction techniques were used on this CT: automated exposure control, adjustment of the mAs and/or kVp according to patient size, and iterative reconstruction techniques. The specific techniques used on this CT exam have been documented in the patient's electronic medical record. Digital Imaging and Communications in Medicine (DICOM) format image data are available to nonaffiliated external healthcare facilities or entities on a secure, media free, reciprocally searchable basis with patient authorization for at least a 12-month period after this study. Comparison exam: Head CT and brain MRI 5/29/2019. Findings: Neck CTA: Any internal carotid stenosis described below uses NASCET criteria, minimal residual lumen diameter versus the non-tapering cervical internal carotid artery. Aortic Arch: Normal branching pattern. No proximal great vessel stenosis. Some ulcerative changes along the aortic arch without large aneurysm or definitive high-grade stenosis. Left carotid: No stenosis or other vascular abnormality. Right carotid:  Mild calcified plaque carotid bulb but no substantial diameter stenosis or other vascular abnormality. The vertebral arteries are left dominant.  Right vertebral artery:  Is characterized by calcified subclavian plaque adjacent to the origin without significant stenosis of the right vertebral which is patent to the basilar. Left vertebral artery:  No stenosis or other vascular abnormality. Lung apices: There is a small nodule in the right lung apex measuring 3 mm with minimal linear density, possibly chronic scarring. On the coronal reformations the lesion has a potentially mildly spiculated appearance and measures 4 mm. No other suspicious lung nodule. Neck soft tissues: Lobulated prominence of the lingual tonsils and prominence of the palatine tonsils are symmetric likely reactive. No definite invasive mass or pathologic adenopathy with small likely reactive lymph nodes bilaterally. Brain CTA: Carotid siphon and supraclinoid internal carotid artery: There is some calcified plaque in the carotid siphon but no definitive high-grade siphon stenosis. Mild stenosis of the junction of the cavernous and supraclinoid internal carotid on the right, felt to be less than 50%. There is a suspicion of a small contour abnormality projecting anterior of the anterior aspect of the right anterior cavernous carotid loop projecting anterior and just lateral, clearly intracavernous but potentially a tiny aneurysm very wide neck measuring 2 mm. No other supraclinoid internal carotid artery aneurysm or stenosis. M1 segment and proximal M2 segment MCA:  Mild-moderate mid right M1 segment stenosis with some irregularity of contour but no definitive high-grade stenosis substantial stenosis origin inferior division right M2 segment, clearly greater than 50%. No left M1 or proximal M2 segment stenosis. . A1 segment, anterior communicating artery and proximal A2 segments:  No significant stenosis or aneurysm. Vertebrobasilar system:  Variant anatomy is elongated V4 segment right vertebral artery with a very distal vertebral basilar junction. Dominant left vertebral. Fenestration proximal V4 segment right vertebral without associated aneurysm. Mild irregularity left P2 segment PCA.  No definite high-grade PCA stenosis. Distal anterior cerebral artery:  Multifocal areas of anterior cerebral artery pericallosal artery stenosis particularly of the smaller left pericallosal artery with an area of severe stenosis just anterior to the anterior genu of the corpus callosum but multifocal disease. Of note there is a chronic substantial left ZOLTAN territory infarction in the distal left ZOLTAN is a very small vessel. Distal MCA M2/M3 segment:  There are some areas of distal MCA stenosis, multifocal consistent with additional areas of intracranial atherosclerosis. No other significant vascular abnormality. Brain parenchyma on source data:  No new parenchymal brain abnormality other than what was previously noted on brain MRI with a substantial chronic superior medial left hemispheric ZOLTAN territory infarction and additional chronic small vessel infarctions. Impression: 1. No hemodynamically significant cervical vascular stenosis. 2. Evidence of intracranial atherosclerosis. Stenosis is most severe involving the left ZOLTAN likely associated with the large chronic left ZOLTAN territory infarction as well as inferior division right M2 segment MCA. Additional more distal MCA and ZOLTAN stenosis as described above. Less severe potentially moderate stenosis mid right M1 segment MCA. 3. 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 ASSESSMENT/IMPRESSION:   74-year-old female with past medical history of left frontal lobe stroke, and hyperlipidemia, presents with acute encephalopathy after a fall. Physical exam is unremarkable except that patient is disoriented, and confused. CT head ruled out acute abnormality, EEG is severely contaminated by EMG artifacts, there may be some transient sharp waves. 1. Acute encephalopathy: metabolic versus delirium in the setting of possible baseline dementia.  Depakote was stopped yesterday. Empiric antibiotics Rocephin was started. Her mental status seems to be mildly improve to me, although daughter says it was the same. Chest x-ray was negative. 2. History of left frontal stroke. PLAN/RECOMMENDATIONS:  1. Continue current treatment of Rocephin. 2. Continue seroquel 25 mg qhs for symptomatic treatment  3. Frequent reorientation at best side. 4. Continue aspirin 81 mg daily and the Lipitor. I will follow the patient. Please do not hesitate to return with any questions. Signed:   Glen Caldwell MD  6/1/2019  8:27 AM

## 2019-06-01 NOTE — ROUTINE PROCESS
Bedside and Verbal shift change report given to RADHA Hayward R.N. (oncoming nurse) by SANDY Brantley R.N. (offgoing nurse). Report included the following information SBAR, Kardex, Intake/Output, MAR, Accordion, Recent Results and Med Rec Status.

## 2019-06-01 NOTE — PROGRESS NOTES
1920- Assumed patient care from off going nurse Marianne Han. Patient resting quietly in bed and is in no acute distress. Patient's call light is within reach, bed in lowest position, and bed alarm on.     0740-Patient had an uneventful shift and has not required a sitter. Purposeful hourly rounding made throughout the shift, NAD noted at this time, and patient is resting quietly in bed with amily at bedside. No concerns or requests voiced. Call light within reach, bed in lowest position, and bed alarm on. Bedside and Verbal shift change report given to [address] [place] [address] (oncoming nurse) by Bernabe Puri RN (offgoing nurse). Report included the following information SBAR, Kardex, MAR and Cardiac Rhythm SR with BBB.

## 2019-06-02 LAB
ALBUMIN SERPL-MCNC: 2.9 G/DL (ref 3.4–5)
ALBUMIN/GLOB SERPL: 0.7 {RATIO} (ref 0.8–1.7)
ALP SERPL-CCNC: 86 U/L (ref 45–117)
ALT SERPL-CCNC: 42 U/L (ref 13–56)
ANION GAP SERPL CALC-SCNC: 9 MMOL/L (ref 3–18)
AST SERPL-CCNC: 33 U/L (ref 15–37)
BACTERIA SPEC CULT: NORMAL
BILIRUB SERPL-MCNC: 0.3 MG/DL (ref 0.2–1)
BUN SERPL-MCNC: 21 MG/DL (ref 7–18)
BUN/CREAT SERPL: 19 (ref 12–20)
CALCIUM SERPL-MCNC: 8.7 MG/DL (ref 8.5–10.1)
CHLORIDE SERPL-SCNC: 104 MMOL/L (ref 100–108)
CO2 SERPL-SCNC: 27 MMOL/L (ref 21–32)
CREAT SERPL-MCNC: 1.1 MG/DL (ref 0.6–1.3)
ERYTHROCYTE [DISTWIDTH] IN BLOOD BY AUTOMATED COUNT: 13.2 % (ref 11.6–14.5)
GLOBULIN SER CALC-MCNC: 4.1 G/DL (ref 2–4)
GLUCOSE BLD STRIP.AUTO-MCNC: 107 MG/DL (ref 70–110)
GLUCOSE BLD STRIP.AUTO-MCNC: 115 MG/DL (ref 70–110)
GLUCOSE BLD STRIP.AUTO-MCNC: 136 MG/DL (ref 70–110)
GLUCOSE BLD STRIP.AUTO-MCNC: 160 MG/DL (ref 70–110)
GLUCOSE SERPL-MCNC: 131 MG/DL (ref 74–99)
HCT VFR BLD AUTO: 34.5 % (ref 35–45)
HGB BLD-MCNC: 11.1 G/DL (ref 12–16)
MCH RBC QN AUTO: 29.4 PG (ref 24–34)
MCHC RBC AUTO-ENTMCNC: 32.2 G/DL (ref 31–37)
MCV RBC AUTO: 91.3 FL (ref 74–97)
PLATELET # BLD AUTO: 254 K/UL (ref 135–420)
PMV BLD AUTO: 9.8 FL (ref 9.2–11.8)
POTASSIUM SERPL-SCNC: 3.8 MMOL/L (ref 3.5–5.5)
PROT SERPL-MCNC: 7 G/DL (ref 6.4–8.2)
RBC # BLD AUTO: 3.78 M/UL (ref 4.2–5.3)
SERVICE CMNT-IMP: NORMAL
SODIUM SERPL-SCNC: 140 MMOL/L (ref 136–145)
T4 FREE SERPL-MCNC: 1.6 NG/DL (ref 0.7–1.5)
TSH SERPL DL<=0.05 MIU/L-ACNC: 1.1 UIU/ML (ref 0.36–3.74)
WBC # BLD AUTO: 9.1 K/UL (ref 4.6–13.2)

## 2019-06-02 PROCEDURE — 86780 TREPONEMA PALLIDUM: CPT

## 2019-06-02 PROCEDURE — 74011250637 HC RX REV CODE- 250/637: Performed by: HOSPITALIST

## 2019-06-02 PROCEDURE — 65660000000 HC RM CCU STEPDOWN

## 2019-06-02 PROCEDURE — 74011250637 HC RX REV CODE- 250/637: Performed by: PSYCHIATRY & NEUROLOGY

## 2019-06-02 PROCEDURE — 85027 COMPLETE CBC AUTOMATED: CPT

## 2019-06-02 PROCEDURE — 74011250636 HC RX REV CODE- 250/636: Performed by: HOSPITALIST

## 2019-06-02 PROCEDURE — 74011000250 HC RX REV CODE- 250: Performed by: HOSPITALIST

## 2019-06-02 PROCEDURE — 86592 SYPHILIS TEST NON-TREP QUAL: CPT

## 2019-06-02 PROCEDURE — 82962 GLUCOSE BLOOD TEST: CPT

## 2019-06-02 PROCEDURE — 80053 COMPREHEN METABOLIC PANEL: CPT

## 2019-06-02 PROCEDURE — 36415 COLL VENOUS BLD VENIPUNCTURE: CPT

## 2019-06-02 PROCEDURE — 74011250637 HC RX REV CODE- 250/637: Performed by: FAMILY MEDICINE

## 2019-06-02 RX ADMIN — ASPIRIN 81 MG 81 MG: 81 TABLET ORAL at 10:10

## 2019-06-02 RX ADMIN — MULTIPLE VITAMINS W/ MINERALS TAB 1 TABLET: TAB at 10:10

## 2019-06-02 RX ADMIN — Medication 10 ML: at 16:27

## 2019-06-02 RX ADMIN — Medication 10 ML: at 05:37

## 2019-06-02 RX ADMIN — QUETIAPINE FUMARATE 25 MG: 25 TABLET ORAL at 23:33

## 2019-06-02 RX ADMIN — Medication 5 MG: at 23:33

## 2019-06-02 RX ADMIN — ACETAMINOPHEN 650 MG: 325 TABLET ORAL at 16:24

## 2019-06-02 RX ADMIN — ENOXAPARIN SODIUM 40 MG: 40 INJECTION SUBCUTANEOUS at 14:39

## 2019-06-02 RX ADMIN — Medication 500 MG: at 10:10

## 2019-06-02 RX ADMIN — CEFTRIAXONE 1 G: 1 INJECTION, POWDER, FOR SOLUTION INTRAMUSCULAR; INTRAVENOUS at 10:17

## 2019-06-02 RX ADMIN — ATORVASTATIN CALCIUM 20 MG: 20 TABLET, FILM COATED ORAL at 10:10

## 2019-06-02 RX ADMIN — Medication 10 ML: at 23:34

## 2019-06-02 NOTE — PROGRESS NOTES
Hospitalist Progress Note    Patient: Isabelle Amin MRN: [id number]  CSN: [id number]    YOB: 1945  Age: 69 y.o. Sex: female    DOA: 5/29/2019 LOS:  LOS: 4 days            Assessment/Plan     Active Problems:    Hemiballismus (5/29/2019)      Confusion (5/29/2019)      Hypertension (5/29/2019)      Wheelchair bound (5/29/2019)      Fall at home, sequela (5/29/2019)      History of stroke (5/29/2019)      Right arm abnormal movements-still happening, but no clear etiology other than prior CVA changes, none acute seen     Encephalopathy/confusion, may be due to vascular dementia, plus recent fall, no clear seizures occurring or by EEG-seroquel started by neurology. Discussed the case with Dr. Jennifer Greco.     Possible UTI-Completed rocephin for 3 days empirically. UCx was neg. CXR/normal      History of stroke, extensive left sided disease     Hypertension: Controlled      Wheelchair dependence due to prior hip injury.     Watch mental status, high risks for fall     PT as tolerated if able to cooperate    Goal-SNF on d/c PENDING        CC: A 70 yo  female with hx of CVA and wheelchair dependence came with abnormal arm movements and confusion  she had a Fall at home. Subjective:      Pt was seen and examined with the nurse in the morning round.      Doing better. Lying in bed, eats well. Still not back her baseline Mental status yet.      Review of systems  Limited 2nd to confusion. Objective:      Visit Vitals  /75   Pulse 74   Temp 98 °F (36.7 °C)   Resp 17   Ht 5' 2\" (1.575 m)   Wt 68.4 kg (150 lb 11.2 oz)   SpO2 97%   BMI 27.56 kg/m²       Physical Exam:    Gen: NAD, non-toxic. Heent:  MMM, NC, AT. Cor: s1s2 RRR. No MRG. PMI mid 5th intercostal space. Resp:  CTA b/l. No w/r/r. Nml effort and diaphragmatic excursion. Abd:  NT ND.  BS positive. No rebound or guarding. No masses. Ext: No edema or cyanosis.       Intake and Output:  Current Shift:  No intake/output data recorded. Last three shifts:  05/31 1901 - 06/02 0700  In: 922 [P.O.:922]  Out: 0     Labs: Results:       Chemistry Recent Labs     06/02/19 0320 06/01/19 0313 05/31/19 0255   * 100* 90    142 141   K 3.8 3.3* 3.7    106 107   CO2 27 26 25   BUN 21* 12 14   CREA 1.10 1.00 0.98   CA 8.7 8.7 8.7   AGAP 9 10 9   BUCR 19 12 14   AP 86 85 90   TP 7.0 6.9 7.6   ALB 2.9* 3.1* 3.4   GLOB 4.1* 3.8 4.2*   AGRAT 0.7* 0.8 0.8      CBC w/Diff Recent Labs     06/02/19 0320 06/01/19 0313 05/31/19 0255   WBC 9.1 7.6 9.6   RBC 3.78* 3.63* 3.70*   HGB 11.1* 10.8* 11.2*   HCT 34.5* 32.7* 33.7*    247 246      Cardiac Enzymes No results for input(s): CPK, CKND1, JAMEE in the last 72 hours. No lab exists for component: CKRMB, TROIP   Coagulation No results for input(s): PTP, INR, APTT in the last 72 hours. No lab exists for component: INREXT    Lipid Panel Lab Results   Component Value Date/Time    Cholesterol, total 111 05/30/2019 05:55 AM    HDL Cholesterol 37 (L) 05/30/2019 05:55 AM    LDL, calculated 50.4 05/30/2019 05:55 AM    VLDL, calculated 23.6 05/30/2019 05:55 AM    Triglyceride 118 05/30/2019 05:55 AM    CHOL/HDL Ratio 3.0 05/30/2019 05:55 AM      BNP No results for input(s): BNPP in the last 72 hours.    Liver Enzymes Recent Labs     06/02/19 0320   TP 7.0   ALB 2.9*   AP 86   SGOT 33      Thyroid Studies No results found for: T4, T3U, TSH, TSHEXT     Procedures/imaging: see electronic medical records for all procedures/Xrays and details which were not copied into this note but were reviewed prior to creation of Plan      Medications Reviewed  Kami Tinoco MD

## 2019-06-02 NOTE — PROGRESS NOTES
NEUROLOGY PROGRESS NOTE        Patient: Eli Villaseñor        Sex: female          DOA: 5/29/2019  YOB: 1945      Age:  69 y.o.         LOS: 4 days     Identification:  [id number] female presents with acute altered mental status after a fall. SUBJECTIVE:   Patient is lying in bed today comfortably. REVIEW OF SYSTEMS: Denies chest pain, abdominal pain, nausea or vomiting. No fever or chills. OBJECTIVE:      Visit Vitals  /75   Pulse 74   Temp 98 °F (36.7 °C)   Resp 17   Ht 5' 2\" (1.575 m)   Wt 68.4 kg (150 lb 11.2 oz)   SpO2 97%   BMI 27.56 kg/m²     Physical Exam:  GENERAL: in no acute distress  HEENT: Moist mucous membranes, sclerae anicteric, scalp is atraumatic. CVS: Regular rate and rhythm, no murmurs or gallops. No carotid bruits. PULMONARY: Clear to auscultation bilaterally. No rales or rhonchi. No wheezing. EXTREMITIES: Normal range of motion at all sites. No deformities. ABDOMEN: Soft, nontender. SKIN: No rashes or ecchymoses. Warm and dry. NEUROLOGIC:She follows very simple commands. patient is oriented to her name. She think that I'm a nurse. She is in [place]. She can name a pen, and  keys. She can tell me that she is not feeling good. There is no regular pattern to her movements. They do not look chorieform, though at times, it is choreiform. She withdraws from noxious stimulus at all sites.        Current Facility-Administered Medications   Medication Dose Route Frequency    QUEtiapine (SEROquel) tablet 25 mg  25 mg Oral QHS    multivitamin, tx-iron-ca-min (THERA-M w/ IRON) tablet 1 Tab  1 Tab Oral DAILY    ascorbic acid (vitamin C) (VITAMIN C) tablet 500 mg  500 mg Oral DAILY    melatonin tablet 5 mg  5 mg Oral QHS    cefTRIAXone (ROCEPHIN) 1 g in sterile water (preservative free) 10 mL IV syringe  1 g IntraVENous Q24H    atorvastatin (LIPITOR) tablet 20 mg  20 mg Oral DAILY    sodium chloride (NS) flush 5-40 mL  5-40 mL IntraVENous Q8H    sodium chloride (NS) flush 5-40 mL  5-40 mL IntraVENous PRN    ondansetron (ZOFRAN) injection 4 mg  4 mg IntraVENous Q6H PRN    aspirin chewable tablet 81 mg  81 mg Oral DAILY    acetaminophen (TYLENOL) tablet 650 mg  650 mg Oral Q4H PRN    enoxaparin (LOVENOX) injection 40 mg  40 mg SubCUTAneous Q24H       Laboratory  Recent Results (from the past 24 hour(s))   CBC W/O DIFF    Collection Time: 06/02/19  3:20 AM   Result Value Ref Range    WBC 9.1 4.6 - 13.2 K/uL    RBC 3.78 (L) 4.20 - 5.30 M/uL    HGB 11.1 (L) 12.0 - 16.0 g/dL    HCT 34.5 (L) 35.0 - 45.0 %    MCV 91.3 74.0 - 97.0 FL    MCH 29.4 24.0 - 34.0 PG    MCHC 32.2 31.0 - 37.0 g/dL    RDW 13.2 11.6 - 14.5 %    PLATELET 816 483 - 117 K/uL    MPV 9.8 9.2 - 16.7 FL   METABOLIC PANEL, COMPREHENSIVE    Collection Time: 06/02/19  3:20 AM   Result Value Ref Range    Sodium 140 136 - 145 mmol/L    Potassium 3.8 3.5 - 5.5 mmol/L    Chloride 104 100 - 108 mmol/L    CO2 27 21 - 32 mmol/L    Anion gap 9 3.0 - 18 mmol/L    Glucose 131 (H) 74 - 99 mg/dL    BUN 21 (H) 7.0 - 18 MG/DL    Creatinine 1.10 0.6 - 1.3 MG/DL    BUN/Creatinine ratio 19 12 - 20      GFR est AA 59 (L) >60 ml/min/1.73m2    GFR est non-AA 49 (L) >60 ml/min/1.73m2    Calcium 8.7 8.5 - 10.1 MG/DL    Bilirubin, total 0.3 0.2 - 1.0 MG/DL    ALT (SGPT) 42 13 - 56 U/L    AST (SGOT) 33 15 - 37 U/L    Alk. phosphatase 86 45 - 117 U/L    Protein, total 7.0 6.4 - 8.2 g/dL    Albumin 2.9 (L) 3.4 - 5.0 g/dL    Globulin 4.1 (H) 2.0 - 4.0 g/dL    A-G Ratio 0.7 (L) 0.8 - 1.7     GLUCOSE, POC    Collection Time: 06/02/19  7:14 AM   Result Value Ref Range    Glucose (POC) 115 (H) 70 - 110 mg/dL       Radiology:  Mri Brain Wo Cont    Result Date: 5/29/2019  Brain MR without contrast: Indication: History of fall 3 days previous from wheelchair. Uncontrollable movements right upper extremity and decreased level of consciousness with increased confusion.  Procedure: Sagittal spin echo T1, axial FSE FLAIR and T2 and axial diffusion weighted scanning was performed. An axial T2* scan optimized to detect hemosiderin and calcium was performed. Coronal spin echo T1and FSE T2 scans were also performed. No contrast was administered. Comparison exam: Head CT 5/29/2019. Findings: Diffusion: There are no areas of restricted diffusion, no acute infarction. The T2* scan shows no acute or chronic hemorrhage or abnormal calcifications. Brain parenchyma: [Substantial ex vacuo dilatation of the left lateral ventricle related to a likely chronic infarction in the superior medial left frontal lobe, an area of focal atrophy with substantial subcortical cystic encephalomalacia and some gliosis. Additional areas of in part fluid signal likely chronic lacunar infarction anterior limb internal capsule on the left and in the right thalamus are noted.] Confluent ischemic changes immediate periventricular white matter. There is substantial diffuse atrophy of the body of the corpus callosum. Likely areas of chronic infarction of bilateral paramedian upper anterior genu and bilateral body of the corpus callosum. The rostrum and splenium are present and relatively intact. No mass or mass effect. There is a right paracentral fluid signal lesion in the mid maged likely an area of chronic lacunar infarction. Likely fluid signal chronic area of infarction in the immediate periventricular right parietal lobe in part communicating with the lateral ventricle. Ventricles and sulci: Mild prominence of sulci in the perisylvian region. The temporal tips are mildly prominent. Extra axial: No extra axial fluid collection or mass. Brain vasculature: Normal, no arterial vascular abnormality is noted. Craniocervical Junction: Normal. Extracranial and skull base:  Mucosal thickening in the paranasal sinuses. No air-fluid level. Impression: 1. No acute hemorrhage or acute infarction. No evidence of significant acute brain injury.  2. Substantial chronic infarction in superior medial left frontal lobe. Likely extensive chronic bilateral corpus callosum infarction. Chronic lacunar infarctions in the left anterior limb internal capsule, right thalamus, right parietal periventricular white matter and right paramedian maged. 3. Atrophy as described with ischemic white matter change. Ct Head Wo Cont    Result Date: 5/29/2019  EXAM: CT head INDICATION: Unwitnessed fall, increasing agitation. COMPARISON: None. TECHNIQUE: Axial CT imaging of the head was performed without intravenous contrast. One or more dose reduction techniques were used on this CT: automated exposure control, adjustment of the mAs and/or kVp according to patient size, and iterative reconstruction techniques. The specific techniques used on this CT exam have been documented in the patient's electronic medical record. Digital Imaging and Communications in Medicine (DICOM) format image data are available to nonaffiliated external healthcare facilities or entities on a secure, media free, reciprocally searchable basis with patient authorization for at least a 12-month period after this study. _______________ FINDINGS: BRAIN AND POSTERIOR FOSSA: There is mild cortical and cerebellar volume loss present. There is ex vacuo dilatation of the left lateral ventricle related to adjacent areas of encephalomalacia within the left frontal lobe and left centrum semiovale basilar cisterns are patent. Subcortical and periventricular white matter low-attenuation is present. Bilateral physiologic basal ganglia calcifications. There is no intracranial hemorrhage, mass effect, or midline shift. Gray-white matter differentiation is within normal limits. EXTRA-AXIAL SPACES AND MENINGES: There are no abnormal extra-axial fluid collections. CALVARIUM: Intact. SINUSES: Imaged paranasal sinuses and mastoid air cells are clear. OTHER: None. _______________     IMPRESSION: 1. No acute intracranial abnormality.  2. Evidence of prior left frontal lobe infarcts with associated ex vacuo dilatation of the anterior horn of the left lateral ventricle. 3. Subcortical and periventricular white matter low-attenuation; favored to reflect sequela of chronic ischemic microvascular change. Cta Head Neck W Wo Cont    Addendum Date: 5/29/2019    Addendum: 4. Somewhat irregular small right upper lobe lung nodule. This could be an area of scarring. Chest CT for correlation is suggested. Result Date: 5/29/2019  Brain CT angiogram and Neck CT angiogram: Indication: Evaluate for stenosis. Possible infarction. Evaluate for source of embolus. History of recent fall with possible head injury. Uncontrolled subsequent movements of her right upper extremity and decreased level of consciousness with increased confusion. Not certain as to the reason why brain and neck vascular imaging was not performed at the time of brain MRI performed earlier. Procedure: Neck CT angiogram: Contrast was administered with a power injector. Axial thin section volume acquisition  scans were obtained timed for peak arterial enhancement. An automated triggering system was used. Axial images were generated. Angiographic coronal and sagittal reformations were also generated. Extensive 3-D separate workstation processing was performed by [employer]. Brain CT angiogram: Dedicated slab MIP overlapping angiographic reconstructions in the sagittal, axial and coronal plane of the brain component of the axial evaluation were also performed. Extensive 3-D separate workstation processing was performed by [employer]. One or more dose reduction techniques were used on this CT: automated exposure control, adjustment of the mAs and/or kVp according to patient size, and iterative reconstruction techniques. The specific techniques used on this CT exam have been documented in the patient's electronic medical record.  Digital Imaging and Communications in Medicine (DICOM) format image data are available to nonaffiliated external healthcare facilities or entities on a secure, media free, reciprocally searchable basis with patient authorization for at least a 12-month period after this study. Comparison exam: Head CT and brain MRI 5/29/2019. Findings: Neck CTA: Any internal carotid stenosis described below uses NASCET criteria, minimal residual lumen diameter versus the non-tapering cervical internal carotid artery. Aortic Arch: Normal branching pattern. No proximal great vessel stenosis. Some ulcerative changes along the aortic arch without large aneurysm or definitive high-grade stenosis. Left carotid: No stenosis or other vascular abnormality. Right carotid:  Mild calcified plaque carotid bulb but no substantial diameter stenosis or other vascular abnormality. The vertebral arteries are left dominant. Right vertebral artery:  Is characterized by calcified subclavian plaque adjacent to the origin without significant stenosis of the right vertebral which is patent to the basilar. Left vertebral artery:  No stenosis or other vascular abnormality. Lung apices: There is a small nodule in the right lung apex measuring 3 mm with minimal linear density, possibly chronic scarring. On the coronal reformations the lesion has a potentially mildly spiculated appearance and measures 4 mm. No other suspicious lung nodule. Neck soft tissues: Lobulated prominence of the lingual tonsils and prominence of the palatine tonsils are symmetric likely reactive. No definite invasive mass or pathologic adenopathy with small likely reactive lymph nodes bilaterally. Brain CTA: Carotid siphon and supraclinoid internal carotid artery: There is some calcified plaque in the carotid siphon but no definitive high-grade siphon stenosis. Mild stenosis of the junction of the cavernous and supraclinoid internal carotid on the right, felt to be less than 50%.  There is a suspicion of a small contour abnormality projecting anterior of the anterior aspect of the right anterior cavernous carotid loop projecting anterior and just lateral, clearly intracavernous but potentially a tiny aneurysm very wide neck measuring 2 mm. No other supraclinoid internal carotid artery aneurysm or stenosis. M1 segment and proximal M2 segment MCA:  Mild-moderate mid right M1 segment stenosis with some irregularity of contour but no definitive high-grade stenosis substantial stenosis origin inferior division right M2 segment, clearly greater than 50%. No left M1 or proximal M2 segment stenosis. . A1 segment, anterior communicating artery and proximal A2 segments:  No significant stenosis or aneurysm. Vertebrobasilar system:  Variant anatomy is elongated V4 segment right vertebral artery with a very distal vertebral basilar junction. Dominant left vertebral. Fenestration proximal V4 segment right vertebral without associated aneurysm. Mild irregularity left P2 segment PCA. No definite high-grade PCA stenosis. Distal anterior cerebral artery:  Multifocal areas of anterior cerebral artery pericallosal artery stenosis particularly of the smaller left pericallosal artery with an area of severe stenosis just anterior to the anterior genu of the corpus callosum but multifocal disease. Of note there is a chronic substantial left ZOLTAN territory infarction in the distal left ZOLTAN is a very small vessel. Distal MCA M2/M3 segment:  There are some areas of distal MCA stenosis, multifocal consistent with additional areas of intracranial atherosclerosis. No other significant vascular abnormality. Brain parenchyma on source data:  No new parenchymal brain abnormality other than what was previously noted on brain MRI with a substantial chronic superior medial left hemispheric ZOLTAN territory infarction and additional chronic small vessel infarctions. Impression: 1. No hemodynamically significant cervical vascular stenosis. 2. Evidence of intracranial atherosclerosis.  Stenosis is most severe involving the left ZOLTAN likely associated with the large chronic left ZOLTAN territory infarction as well as inferior division right M2 segment MCA. Additional more distal MCA and ZOLTAN stenosis as described above. Less severe potentially moderate stenosis mid right M1 segment MCA. 3. 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 ASSESSMENT/IMPRESSION:   72-year-old female with past medical history of left frontal lobe stroke, and hyperlipidemia, presents with acute encephalopathy after a fall. Physical exam is unremarkable except that patient is disoriented, and confused. CT head ruled out acute abnormality, EEG is severely contaminated by EMG artifacts, there may be some transient sharp waves. 1. Acute encephalopathy:improving today compared to two days ago. I still think it is metabolic due to UTI since her mental status seems to be improved after antibiotics were started. There may be some delirium in the setting of possible baseline dementia. Depakote was stopped yesterday. Empiric antibiotics Rocephin was started. 2. History of left frontal stroke. PLAN/RECOMMENDATIONS:  1. Continue current treatment of Rocephin. 2. Continue seroquel 25 mg qhs for symptomatic treatment  3. Frequent reorientation at best side. 4. Continue aspirin 81 mg daily and the Lipitor. I will follow the patient. Please do not hesitate to return with any questions. Signed:   Yonis Lorenzo MD  6/2/2019  8:27 AM

## 2019-06-02 NOTE — PROGRESS NOTES
1509:  Attempted telephone translation with no success. Told by  that patient was screaming and could not be understood.  stated patient keeps saying, \"I am not old, I am not stupid, I am in [place].\"  Attempted to contact patient daughter [facility] with no success, voicemail left. Will try again later.

## 2019-06-02 NOTE — PROGRESS NOTES
1915- Assumed patient care from off going nurse Elis Zapata RN. Patient lying quietly in bed with family at bedside. NAD noted, no signs/symptoms of pain, call light within reach, and bed in lowest position. Will continue to monitor. 0467- Patient had an uneventful shift and remained stable. Purposeful hourly rounding made throughout the night. NAD noted at this time and patient is resting quietly in bed. No concerns or requests voiced. Bedside and Verbal shift change report given to [address] [place] [address] (oncoming nurse) by Jose G Collins RN (offgoing nurse). Report included the following information SBAR, Kardex, MAR, Accordion and Cardiac Rhythm NSR with BBB.

## 2019-06-03 LAB
ALBUMIN SERPL-MCNC: 3.1 G/DL (ref 3.4–5)
ALBUMIN/GLOB SERPL: 0.7 {RATIO} (ref 0.8–1.7)
ALP SERPL-CCNC: 91 U/L (ref 45–117)
ALT SERPL-CCNC: 54 U/L (ref 13–56)
ANION GAP SERPL CALC-SCNC: 9 MMOL/L (ref 3–18)
AST SERPL-CCNC: 45 U/L (ref 15–37)
BILIRUB SERPL-MCNC: 0.4 MG/DL (ref 0.2–1)
BUN SERPL-MCNC: 15 MG/DL (ref 7–18)
BUN/CREAT SERPL: 14 (ref 12–20)
CALCIUM SERPL-MCNC: 8.8 MG/DL (ref 8.5–10.1)
CHLORIDE SERPL-SCNC: 104 MMOL/L (ref 100–108)
CO2 SERPL-SCNC: 27 MMOL/L (ref 21–32)
CREAT SERPL-MCNC: 1.11 MG/DL (ref 0.6–1.3)
ERYTHROCYTE [DISTWIDTH] IN BLOOD BY AUTOMATED COUNT: 13.2 % (ref 11.6–14.5)
GLOBULIN SER CALC-MCNC: 4.3 G/DL (ref 2–4)
GLUCOSE SERPL-MCNC: 112 MG/DL (ref 74–99)
HCT VFR BLD AUTO: 35.8 % (ref 35–45)
HGB BLD-MCNC: 11.6 G/DL (ref 12–16)
MCH RBC QN AUTO: 29.7 PG (ref 24–34)
MCHC RBC AUTO-ENTMCNC: 32.4 G/DL (ref 31–37)
MCV RBC AUTO: 91.6 FL (ref 74–97)
PLATELET # BLD AUTO: 271 K/UL (ref 135–420)
PMV BLD AUTO: 9.9 FL (ref 9.2–11.8)
POTASSIUM SERPL-SCNC: 3.7 MMOL/L (ref 3.5–5.5)
PROT SERPL-MCNC: 7.4 G/DL (ref 6.4–8.2)
RBC # BLD AUTO: 3.91 M/UL (ref 4.2–5.3)
RPR SER QL: NONREACTIVE
SODIUM SERPL-SCNC: 140 MMOL/L (ref 136–145)
T PALLIDUM AB SER QL IA: ABNORMAL
WBC # BLD AUTO: 9.2 K/UL (ref 4.6–13.2)

## 2019-06-03 PROCEDURE — 74011250637 HC RX REV CODE- 250/637: Performed by: PSYCHIATRY & NEUROLOGY

## 2019-06-03 PROCEDURE — 74011250636 HC RX REV CODE- 250/636: Performed by: HOSPITALIST

## 2019-06-03 PROCEDURE — 36415 COLL VENOUS BLD VENIPUNCTURE: CPT

## 2019-06-03 PROCEDURE — 65660000000 HC RM CCU STEPDOWN

## 2019-06-03 PROCEDURE — 85027 COMPLETE CBC AUTOMATED: CPT

## 2019-06-03 PROCEDURE — 74011250637 HC RX REV CODE- 250/637: Performed by: FAMILY MEDICINE

## 2019-06-03 PROCEDURE — 80053 COMPREHEN METABOLIC PANEL: CPT

## 2019-06-03 PROCEDURE — 74011250637 HC RX REV CODE- 250/637: Performed by: HOSPITALIST

## 2019-06-03 RX ORDER — GUAIFENESIN 100 MG/5ML
81 LIQUID (ML) ORAL DAILY
Qty: 2 TAB | Refills: 0 | Status: SHIPPED
Start: 2019-06-04

## 2019-06-03 RX ORDER — CHOLECALCIFEROL (VITAMIN D3) 125 MCG
5 CAPSULE ORAL
Qty: 2 TAB | Refills: 0 | Status: SHIPPED | OUTPATIENT
Start: 2019-06-03

## 2019-06-03 RX ORDER — QUETIAPINE FUMARATE 25 MG/1
25 TABLET, FILM COATED ORAL
Qty: 10 TAB | Refills: 0 | Status: SHIPPED | OUTPATIENT
Start: 2019-06-03 | End: 2019-06-06 | Stop reason: SDUPTHER

## 2019-06-03 RX ORDER — POLYETHYLENE GLYCOL 3350 17 G/17G
17 POWDER, FOR SOLUTION ORAL DAILY
Status: DISCONTINUED | OUTPATIENT
Start: 2019-06-03 | End: 2019-06-06 | Stop reason: HOSPADM

## 2019-06-03 RX ADMIN — ENOXAPARIN SODIUM 40 MG: 40 INJECTION SUBCUTANEOUS at 13:16

## 2019-06-03 RX ADMIN — ATORVASTATIN CALCIUM 20 MG: 20 TABLET, FILM COATED ORAL at 11:06

## 2019-06-03 RX ADMIN — MULTIPLE VITAMINS W/ MINERALS TAB 1 TABLET: TAB at 11:06

## 2019-06-03 RX ADMIN — ASPIRIN 81 MG 81 MG: 81 TABLET ORAL at 11:06

## 2019-06-03 RX ADMIN — POLYETHYLENE GLYCOL 3350 17 G: 17 POWDER, FOR SOLUTION ORAL at 13:16

## 2019-06-03 RX ADMIN — Medication 5 MG: at 22:07

## 2019-06-03 RX ADMIN — Medication 10 ML: at 13:17

## 2019-06-03 RX ADMIN — QUETIAPINE FUMARATE 25 MG: 25 TABLET ORAL at 22:07

## 2019-06-03 RX ADMIN — Medication 500 MG: at 11:06

## 2019-06-03 NOTE — DISCHARGE SUMMARY
[address]    Name:  Dilcia Dubois  MR#:   [id number]  :  1945  ACCOUNT #:  [de-identified]  ADMIT DATE:  2019  DISCHARGE DATE:    DISCHARGE DIAGNOSES:  1. Encephalopathy. 2.  Right arm abnormal movements. 3.  History of prior extensive left-sided strokes. 4.  Vascular dementia. 5.  Hypertension. HOSPITAL CONSULTANT: Dr. Oanh Mena, Neurology. HOSPITAL COURSE:  This is a 74-year-old  female who is brought to the hospital on  because she had fallen from her wheelchair three days prior and then ultimately started becoming more confused per her daughter's report with right arm abnormal movements, worsening confusion. When she came into the emergency room, there was no acute evidence for new stroke. She has had prior extensive strokes, is wheelchair dependent, also from a chronic leg issue as well. She was seen by Neurology. We had extensive testing done for her here including an EEG, MRI of the brain. The MRI showed prior notable left-sided strokes, chronic disease, but no acute hemorrhage or infarction. She has had an EEG done, there was no evidence for acute seizure activity. She was treated empirically for seizures but then that was discontinued. Chest x-ray showed no acute cardiopulmonary process. White count has been normal since admission. Urinalysis was questionable for possible UTI on , repeated on the  that came back negative. Culture was sent, it came back no growth for two days but she was treated empirically for a possible infection. Urine drug screen came back negative and she is otherwise improved. Her mental status is significantly improved as when initially she came in, she was delirious, making abnormal sounds, not making any sensible speech per her daughter who translates for her, blood sugars were unremarkable here and ultimately she has gotten better. She is now participating, she is cooperating, she says okay.   She can actually say some English sentences to me this morning. She is sitting on the bedside commode trying to have a bowel movement. Her daughter is present. We are arranging for skilled nursing for her as she has become more debilitated than usual and could benefit from rehabilitation. Her temperature is 97.6, pulse 72, blood pressure 116/58, respiratory rate 16, SaO2 is 97% on room air. Elderly  female, awake and alert, in no acute distress, moving her right arm what I can see normally at this point. Her lungs are clear bilaterally. Cardiac exam, regular rate and rhythm. No murmur, rub or gallop. Her abdomen is soft, nontender, no distention. Her lower extremities, no edema noted. Of note, she did have an echocardiogram completed here that showed an EF of 61-65% with grade I left ventricular diastolic dysfunction. She is medically stable to leave to go to a facility once arrangements are made and an accepting facility is found. DISCHARGE MEDICATIONS:  On discharge, she should have  1. Aspirin 81 mg daily. 2.  Lipitor 20 mg daily. 3.  Cozaar 50 mg daily. 4.  Multivitamin with iron 1 tablet daily. 5.  Seroquel 25 mg at night. 6.  Vitamin D 50,000 units weekly and a prescription has been written for the Seroquel. DIET INSTRUCTIONS:  Cardiac regular with nutritional supplement Deangelo with lunch and dinner. DISCHARGE INSTRUCTIONS:  She is a full code status. Her daughter is involved in her medical care. I spent 40 minutes on discharge time today. She is stable to leave once arrangements are made. Toribio Quiñones MD      [id number]  D:  06/03/2019 11:27  T:  06/03/2019 11:31  JOB #:  [id number]  CC:   Haydee Lai MD

## 2019-06-03 NOTE — PROGRESS NOTES
Transition of care: Met with patient and daughter at bedside. Cm has submitted for rehab but has not had any accepting facilities at this time. Cm has spoken with Austin Crigler admissions coordinator at [facility] she will look at patient and she will let cm know if she is able to accommodate today. Patient will need authorization. Met with Dr. Viviane Mcardle and she is ready for for d/c to rehab if has accepting facility. Patient does speak english today. No sitter today cm will. Per Med assist patient cannot get medicaid due to is [place]  continue to follow      Telephone call from [facility] admissions. They are out of network. [facility] and to include  per her insurance can only got a [facility] and due to her language barrier and she may need LTC the facilities will not accommodate her due to she does not qualify for medicaid due to she is not a [place]. Cm will continue to reach out to further facilities  Care Management Interventions  PCP Verified by CM: Yes  Transition of Care Consult (CM Consult): Discharge Planning  Current Support Network: Relative's Home  Confirm Follow Up Transport: Family  Plan discussed with Pt/Family/Caregiver: Yes  Freedom of Choice Offered:  Yes

## 2019-06-03 NOTE — PROGRESS NOTES
NEUROLOGY PROGRESS NOTE        Patient: Yelena Bowden        Sex: female          DOA: 5/29/2019  YOB: 1945      Age:  69 y.o.         LOS: 5 days     Identification:  [id number] female presents with acute altered mental status after a fall. SUBJECTIVE:   Patient is sitting at bedside commode today, she complains of constipation. I talked to her nurse who reported that daughter feels that she is a little better. REVIEW OF SYSTEMS: Denies chest pain, abdominal pain, nausea or vomiting. No fever or chills. OBJECTIVE:      Visit Vitals  /58   Pulse 72   Temp 97.6 °F (36.4 °C)   Resp 16   Ht 5' 2\" (1.575 m)   Wt 68.4 kg (150 lb 11.2 oz)   SpO2 97%   BMI 27.56 kg/m²     Physical Exam:  GENERAL: in no acute distress  HEENT: Moist mucous membranes, sclerae anicteric, scalp is atraumatic. CVS: Regular rate and rhythm, no murmurs or gallops. No carotid bruits. PULMONARY: Clear to auscultation bilaterally. No rales or rhonchi. No wheezing. EXTREMITIES: Normal range of motion at all sites. No deformities. ABDOMEN: Soft, nontender. SKIN: No rashes or ecchymoses. Warm and dry. NEUROLOGIC:She follows  simple commands. patient is oriented to her name and state. She can name a pen, phone and  keys. When I ask her if she feels OK, she said 'no feeling'. I asked if that means she feels better, she said 'no she does not feel better (I think she means that she has constipation)'.      Current Facility-Administered Medications   Medication Dose Route Frequency    polyethylene glycol (MIRALAX) packet 17 g  17 g Oral DAILY    QUEtiapine (SEROquel) tablet 25 mg  25 mg Oral QHS    multivitamin, tx-iron-ca-min (THERA-M w/ IRON) tablet 1 Tab  1 Tab Oral DAILY    ascorbic acid (vitamin C) (VITAMIN C) tablet 500 mg  500 mg Oral DAILY    melatonin tablet 5 mg  5 mg Oral QHS    atorvastatin (LIPITOR) tablet 20 mg  20 mg Oral DAILY    sodium chloride (NS) flush 5-40 mL  5-40 mL IntraVENous Q8H  sodium chloride (NS) flush 5-40 mL  5-40 mL IntraVENous PRN    ondansetron (ZOFRAN) injection 4 mg  4 mg IntraVENous Q6H PRN    aspirin chewable tablet 81 mg  81 mg Oral DAILY    acetaminophen (TYLENOL) tablet 650 mg  650 mg Oral Q4H PRN    enoxaparin (LOVENOX) injection 40 mg  40 mg SubCUTAneous Q24H       Laboratory  Recent Results (from the past 24 hour(s))   GLUCOSE, POC    Collection Time: 06/02/19  4:28 PM   Result Value Ref Range    Glucose (POC) 136 (H) 70 - 110 mg/dL   GLUCOSE, POC    Collection Time: 06/02/19  9:10 PM   Result Value Ref Range    Glucose (POC) 107 70 - 110 mg/dL   CBC W/O DIFF    Collection Time: 06/03/19  4:00 AM   Result Value Ref Range    WBC 9.2 4.6 - 13.2 K/uL    RBC 3.91 (L) 4.20 - 5.30 M/uL    HGB 11.6 (L) 12.0 - 16.0 g/dL    HCT 35.8 35.0 - 45.0 %    MCV 91.6 74.0 - 97.0 FL    MCH 29.7 24.0 - 34.0 PG    MCHC 32.4 31.0 - 37.0 g/dL    RDW 13.2 11.6 - 14.5 %    PLATELET 112 941 - 806 K/uL    MPV 9.9 9.2 - 23.4 FL   METABOLIC PANEL, COMPREHENSIVE    Collection Time: 06/03/19  4:00 AM   Result Value Ref Range    Sodium 140 136 - 145 mmol/L    Potassium 3.7 3.5 - 5.5 mmol/L    Chloride 104 100 - 108 mmol/L    CO2 27 21 - 32 mmol/L    Anion gap 9 3.0 - 18 mmol/L    Glucose 112 (H) 74 - 99 mg/dL    BUN 15 7.0 - 18 MG/DL    Creatinine 1.11 0.6 - 1.3 MG/DL    BUN/Creatinine ratio 14 12 - 20      GFR est AA 59 (L) >60 ml/min/1.73m2    GFR est non-AA 48 (L) >60 ml/min/1.73m2    Calcium 8.8 8.5 - 10.1 MG/DL    Bilirubin, total 0.4 0.2 - 1.0 MG/DL    ALT (SGPT) 54 13 - 56 U/L    AST (SGOT) 45 (H) 15 - 37 U/L    Alk. phosphatase 91 45 - 117 U/L    Protein, total 7.4 6.4 - 8.2 g/dL    Albumin 3.1 (L) 3.4 - 5.0 g/dL    Globulin 4.3 (H) 2.0 - 4.0 g/dL    A-G Ratio 0.7 (L) 0.8 - 1.7         Radiology:  Mri Brain Wo Cont    Result Date: 5/29/2019  Brain MR without contrast: Indication: History of fall 3 days previous from wheelchair.  Uncontrollable movements right upper extremity and decreased level of consciousness with increased confusion. Procedure: Sagittal spin echo T1, axial FSE FLAIR and T2 and axial diffusion weighted scanning was performed. An axial T2* scan optimized to detect hemosiderin and calcium was performed. Coronal spin echo T1and FSE T2 scans were also performed. No contrast was administered. Comparison exam: Head CT 5/29/2019. Findings: Diffusion: There are no areas of restricted diffusion, no acute infarction. The T2* scan shows no acute or chronic hemorrhage or abnormal calcifications. Brain parenchyma: [Substantial ex vacuo dilatation of the left lateral ventricle related to a likely chronic infarction in the superior medial left frontal lobe, an area of focal atrophy with substantial subcortical cystic encephalomalacia and some gliosis. Additional areas of in part fluid signal likely chronic lacunar infarction anterior limb internal capsule on the left and in the right thalamus are noted.] Confluent ischemic changes immediate periventricular white matter. There is substantial diffuse atrophy of the body of the corpus callosum. Likely areas of chronic infarction of bilateral paramedian upper anterior genu and bilateral body of the corpus callosum. The rostrum and splenium are present and relatively intact. No mass or mass effect. There is a right paracentral fluid signal lesion in the mid maged likely an area of chronic lacunar infarction. Likely fluid signal chronic area of infarction in the immediate periventricular right parietal lobe in part communicating with the lateral ventricle. Ventricles and sulci: Mild prominence of sulci in the perisylvian region. The temporal tips are mildly prominent. Extra axial: No extra axial fluid collection or mass. Brain vasculature: Normal, no arterial vascular abnormality is noted. Craniocervical Junction: Normal. Extracranial and skull base:  Mucosal thickening in the paranasal sinuses. No air-fluid level. Impression: 1.  No acute hemorrhage or acute infarction. No evidence of significant acute brain injury. 2. Substantial chronic infarction in superior medial left frontal lobe. Likely extensive chronic bilateral corpus callosum infarction. Chronic lacunar infarctions in the left anterior limb internal capsule, right thalamus, right parietal periventricular white matter and right paramedian maged. 3. Atrophy as described with ischemic white matter change. Ct Head Wo Cont    Result Date: 5/29/2019  EXAM: CT head INDICATION: Unwitnessed fall, increasing agitation. COMPARISON: None. TECHNIQUE: Axial CT imaging of the head was performed without intravenous contrast. One or more dose reduction techniques were used on this CT: automated exposure control, adjustment of the mAs and/or kVp according to patient size, and iterative reconstruction techniques. The specific techniques used on this CT exam have been documented in the patient's electronic medical record. Digital Imaging and Communications in Medicine (DICOM) format image data are available to nonaffiliated external healthcare facilities or entities on a secure, media free, reciprocally searchable basis with patient authorization for at least a 12-month period after this study. _______________ FINDINGS: BRAIN AND POSTERIOR FOSSA: There is mild cortical and cerebellar volume loss present. There is ex vacuo dilatation of the left lateral ventricle related to adjacent areas of encephalomalacia within the left frontal lobe and left centrum semiovale basilar cisterns are patent. Subcortical and periventricular white matter low-attenuation is present. Bilateral physiologic basal ganglia calcifications. There is no intracranial hemorrhage, mass effect, or midline shift. Gray-white matter differentiation is within normal limits. EXTRA-AXIAL SPACES AND MENINGES: There are no abnormal extra-axial fluid collections. CALVARIUM: Intact.  SINUSES: Imaged paranasal sinuses and mastoid air cells are clear. OTHER: None. _______________     IMPRESSION: 1. No acute intracranial abnormality. 2. Evidence of prior left frontal lobe infarcts with associated ex vacuo dilatation of the anterior horn of the left lateral ventricle. 3. Subcortical and periventricular white matter low-attenuation; favored to reflect sequela of chronic ischemic microvascular change. Cta Head Neck W Wo Cont    Addendum Date: 5/29/2019    Addendum: 4. Somewhat irregular small right upper lobe lung nodule. This could be an area of scarring. Chest CT for correlation is suggested. Result Date: 5/29/2019  Brain CT angiogram and Neck CT angiogram: Indication: Evaluate for stenosis. Possible infarction. Evaluate for source of embolus. History of recent fall with possible head injury. Uncontrolled subsequent movements of her right upper extremity and decreased level of consciousness with increased confusion. Not certain as to the reason why brain and neck vascular imaging was not performed at the time of brain MRI performed earlier. Procedure: Neck CT angiogram: Contrast was administered with a power injector. Axial thin section volume acquisition  scans were obtained timed for peak arterial enhancement. An automated triggering system was used. Axial images were generated. Angiographic coronal and sagittal reformations were also generated. Extensive 3-D separate workstation processing was performed by vitalclip. Brain CT angiogram: Dedicated slab MIP overlapping angiographic reconstructions in the sagittal, axial and coronal plane of the brain component of the axial evaluation were also performed. Extensive 3-D separate workstation processing was performed by [employer]. One or more dose reduction techniques were used on this CT: automated exposure control, adjustment of the mAs and/or kVp according to patient size, and iterative reconstruction techniques.   The specific techniques used on this CT exam have been documented in the patient's electronic medical record. Digital Imaging and Communications in Medicine (DICOM) format image data are available to nonaffiliated external healthcare facilities or entities on a secure, media free, reciprocally searchable basis with patient authorization for at least a 12-month period after this study. Comparison exam: Head CT and brain MRI 5/29/2019. Findings: Neck CTA: Any internal carotid stenosis described below uses NASCET criteria, minimal residual lumen diameter versus the non-tapering cervical internal carotid artery. Aortic Arch: Normal branching pattern. No proximal great vessel stenosis. Some ulcerative changes along the aortic arch without large aneurysm or definitive high-grade stenosis. Left carotid: No stenosis or other vascular abnormality. Right carotid:  Mild calcified plaque carotid bulb but no substantial diameter stenosis or other vascular abnormality. The vertebral arteries are left dominant. Right vertebral artery:  Is characterized by calcified subclavian plaque adjacent to the origin without significant stenosis of the right vertebral which is patent to the basilar. Left vertebral artery:  No stenosis or other vascular abnormality. Lung apices: There is a small nodule in the right lung apex measuring 3 mm with minimal linear density, possibly chronic scarring. On the coronal reformations the lesion has a potentially mildly spiculated appearance and measures 4 mm. No other suspicious lung nodule. Neck soft tissues: Lobulated prominence of the lingual tonsils and prominence of the palatine tonsils are symmetric likely reactive. No definite invasive mass or pathologic adenopathy with small likely reactive lymph nodes bilaterally. Brain CTA: Carotid siphon and supraclinoid internal carotid artery: There is some calcified plaque in the carotid siphon but no definitive high-grade siphon stenosis.  Mild stenosis of the junction of the cavernous and supraclinoid internal carotid on the right, felt to be less than 50%. There is a suspicion of a small contour abnormality projecting anterior of the anterior aspect of the right anterior cavernous carotid loop projecting anterior and just lateral, clearly intracavernous but potentially a tiny aneurysm very wide neck measuring 2 mm. No other supraclinoid internal carotid artery aneurysm or stenosis. M1 segment and proximal M2 segment MCA:  Mild-moderate mid right M1 segment stenosis with some irregularity of contour but no definitive high-grade stenosis substantial stenosis origin inferior division right M2 segment, clearly greater than 50%. No left M1 or proximal M2 segment stenosis. . A1 segment, anterior communicating artery and proximal A2 segments:  No significant stenosis or aneurysm. Vertebrobasilar system:  Variant anatomy is elongated V4 segment right vertebral artery with a very distal vertebral basilar junction. Dominant left vertebral. Fenestration proximal V4 segment right vertebral without associated aneurysm. Mild irregularity left P2 segment PCA. No definite high-grade PCA stenosis. Distal anterior cerebral artery:  Multifocal areas of anterior cerebral artery pericallosal artery stenosis particularly of the smaller left pericallosal artery with an area of severe stenosis just anterior to the anterior genu of the corpus callosum but multifocal disease. Of note there is a chronic substantial left ZOLTAN territory infarction in the distal left ZOLTAN is a very small vessel. Distal MCA M2/M3 segment:  There are some areas of distal MCA stenosis, multifocal consistent with additional areas of intracranial atherosclerosis. No other significant vascular abnormality. Brain parenchyma on source data:  No new parenchymal brain abnormality other than what was previously noted on brain MRI with a substantial chronic superior medial left hemispheric ZOLTAN territory infarction and additional chronic small vessel infarctions. Impression: 1.  No hemodynamically significant cervical vascular stenosis. 2. Evidence of intracranial atherosclerosis. Stenosis is most severe involving the left ZOLTAN likely associated with the large chronic left ZOLTAN territory infarction as well as inferior division right M2 segment MCA. Additional more distal MCA and ZOLTAN stenosis as described above. Less severe potentially moderate stenosis mid right M1 segment MCA. 3. 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 ASSESSMENT/IMPRESSION:   30-year-old female with past medical history of left frontal lobe stroke, and hyperlipidemia, presents with acute encephalopathy after a fall. Physical exam is unremarkable except that patient is disoriented, and confused. CT head ruled out acute abnormality, EEG is severely contaminated by EMG artifacts, there may be some transient sharp waves. 1. Acute encephalopathy: mental status is improving. Her daughter also agrees that she is doing a little better than the day when she was admitted. It is likely metabolic encephalopathy due to UTI since her mental status seems to be improved after antibiotics were started. There may be some delirium in the setting of possible baseline dementia. Depakote was stopped yesterday. Empiric antibiotics Rocephin was started. 2. History of left frontal stroke. PLAN/RECOMMENDATIONS:  1. Continue current treatment of Rocephin. 2. Continue seroquel 25 mg qhs for symptomatic treatment  3. Frequent reorientation at best side. 4. Continue aspirin 81 mg daily and  Lipitor. Neurology will sign off. Please do not hesitate to return with any questions. Signed:   Ebony Woody MD  6/3/2019  8:27 AM

## 2019-06-03 NOTE — PROGRESS NOTES
Speech Therapy Note:    Follow up attempted twice this AM. Could not progress with ST intervention because patient:      []  Lethargic, unable to be alerted enough for safe PO intake  []  Refused participation  []  Off the unit  []  NPO for procedure  [x]  Other: using bedside commode    SLP will re-attempt treatment later this day or as schedule permits.     Cy Diaz M.S., [id number] [facility]  Speech Language Pathologist

## 2019-06-03 NOTE — PROGRESS NOTES
0730:Received verbal bedside report from off going nurse SANDY Metz R.N. Patient care received. Patient alert to self and disoriented to place, time, and situation . Patient resting in bed. Patient stable. Call light with in reach bed in lowest position.

## 2019-06-03 NOTE — PROGRESS NOTES
1900: Assumed care of the patient from Natalie Jacinto RN. Patient is resting quietly in bed in no acute distress. Patient denies chest pain or SOB. Bed is locked in low position and Call light is within reach. 0730: Shift uneventful. Bedside and Verbal shift change report given to Lupe Ellis RN (oncoming nurse) by Addie Price RN (offgoing nurse). Report included the following information SBAR, Kardex, Intake/Output, MAR, Accordion and Recent Results.

## 2019-06-03 NOTE — PROGRESS NOTES
Problem: Pressure Injury - Risk of  Goal: *Prevention of pressure injury  Description  Document Theodore Scale and appropriate interventions in the flowsheet. Outcome: Progressing Towards Goal     Problem: Patient Education: Go to Patient Education Activity  Goal: Patient/Family Education  Outcome: Progressing Towards Goal     Problem: Falls - Risk of  Goal: *Absence of Falls  Description  Document Laura Mendiola Fall Risk and appropriate interventions in the flowsheet.   Outcome: Progressing Towards Goal     Problem: Patient Education: Go to Patient Education Activity  Goal: Patient/Family Education  Outcome: Progressing Towards Goal     Problem: Patient Education: Go to Patient Education Activity  Goal: Patient/Family Education  Outcome: Progressing Towards Goal

## 2019-06-03 NOTE — PROGRESS NOTES
Problem: Pressure Injury - Risk of  Goal: *Prevention of pressure injury  Description  Document Theodore Scale and appropriate interventions in the flowsheet. Outcome: Progressing Towards Goal     Problem: Patient Education: Go to Patient Education Activity  Goal: Patient/Family Education  Outcome: Progressing Towards Goal     Problem: Falls - Risk of  Goal: *Absence of Falls  Description  Document Janny Bear Fall Risk and appropriate interventions in the flowsheet.   Outcome: Progressing Towards Goal     Problem: Patient Education: Go to Patient Education Activity  Goal: Patient/Family Education  Outcome: Progressing Towards Goal     Problem: Patient Education: Go to Patient Education Activity  Goal: Patient/Family Education  Outcome: Progressing Towards Goal

## 2019-06-04 ENCOUNTER — APPOINTMENT (OUTPATIENT)
Dept: GENERAL RADIOLOGY | Age: 74
DRG: 056 | End: 2019-06-04
Attending: HOSPITALIST
Payer: COMMERCIAL

## 2019-06-04 PROCEDURE — 74011250637 HC RX REV CODE- 250/637: Performed by: PSYCHIATRY & NEUROLOGY

## 2019-06-04 PROCEDURE — 73600 X-RAY EXAM OF ANKLE: CPT

## 2019-06-04 PROCEDURE — 74011250637 HC RX REV CODE- 250/637: Performed by: HOSPITALIST

## 2019-06-04 PROCEDURE — 65660000000 HC RM CCU STEPDOWN

## 2019-06-04 PROCEDURE — 97162 PT EVAL MOD COMPLEX 30 MIN: CPT

## 2019-06-04 PROCEDURE — 74011250637 HC RX REV CODE- 250/637: Performed by: FAMILY MEDICINE

## 2019-06-04 PROCEDURE — 74011250636 HC RX REV CODE- 250/636: Performed by: HOSPITALIST

## 2019-06-04 RX ADMIN — Medication 500 MG: at 10:43

## 2019-06-04 RX ADMIN — ENOXAPARIN SODIUM 40 MG: 40 INJECTION SUBCUTANEOUS at 14:15

## 2019-06-04 RX ADMIN — Medication 10 ML: at 14:25

## 2019-06-04 RX ADMIN — MULTIPLE VITAMINS W/ MINERALS TAB 1 TABLET: TAB at 10:43

## 2019-06-04 RX ADMIN — Medication 5 MG: at 21:31

## 2019-06-04 RX ADMIN — POLYETHYLENE GLYCOL 3350 17 G: 17 POWDER, FOR SOLUTION ORAL at 10:43

## 2019-06-04 RX ADMIN — QUETIAPINE FUMARATE 25 MG: 25 TABLET ORAL at 21:31

## 2019-06-04 RX ADMIN — Medication 10 ML: at 21:32

## 2019-06-04 RX ADMIN — ATORVASTATIN CALCIUM 20 MG: 20 TABLET, FILM COATED ORAL at 10:42

## 2019-06-04 RX ADMIN — ASPIRIN 81 MG 81 MG: 81 TABLET ORAL at 10:43

## 2019-06-04 NOTE — PROGRESS NOTES
2020 Tele box returned to the [facility] per d/c order. [id number] Shift Summary: Pt rested well overnight with no complaints. No new clinical concerns noted. Bed alarm remained on at all times.

## 2019-06-04 NOTE — PROGRESS NOTES
Pt dtr aware of care plan and pt has minimal understanding of the care plan, continue to progress with family and pt

## 2019-06-04 NOTE — ROUTINE PROCESS
Bedside and Verbal shift change report given to MAYI Branch R.N. (oncoming nurse) by SANDY Brantley R.N. (offgoing nurse). Report included the following information SBAR, Kardex, Intake/Output, MAR, Accordion, Recent Results and Med Rec Status.

## 2019-06-04 NOTE — PROGRESS NOTES
0730: Bedside shift change report given to Yuly Rocha RN  (oncoming nurse) by MAYI Branch RN (offgoing nurse). Report included the following information kardex, MAR, labs, synopsis  Yuly Rocha RN     1010: x ray in to take image of pt RLE ankle  MAYI Andrews RN    1010: pt dtr records x ray tech taking image, this nurse makes pt dtr aware, medical staff can not be recorded due to violation, dtr acknowledges and stops recording  Yuly Rocha RN    0: security arrives in pt room and in the hallway makes this nurse aware he needs to speak with pt dtr in reference to the video recording on her phone voiced by the x ray technician, this nurse explains  communication need to pt dtr. Security speaks with dtr and watches her erase the video from her phone and explains why there is no recording of medical staff, pt dtr acknowledges and makes staff aware she was sending it to her sister that she erased the video and will not take pictures or images again, security makes this nurse aware of findings, continue to assess and monitor pt for any change   MAYI Andrews RN     1930: Bedside shift change report given to salty Saleem RN  (oncoming nurse) by Yuly Rocha RN  (offgoing nurse). Report included the following information Kardex, Cardiac Rhythm no tele noted and Alarm Parameters .

## 2019-06-04 NOTE — PROGRESS NOTES
6/4/2019 PT note: consult received and chart reviewed. Noted R ankle x-ray results information in progress currently. Will follow up as appropriate once results known. Thank you.    Radha Felix, PT

## 2019-06-04 NOTE — PROGRESS NOTES
Hospitalist Progress Note    Patient: Bianca Newell MRN: [id number]  CSN: [id number]    YOB: 1945  Age: 69 y.o. Sex: female    DOA: 5/29/2019 LOS:  LOS: 6 days          Chief Complaint:    encephalopathy      Assessment/Plan     1. Encephalopathy. Improved but continued confusion  2. Right arm abnormal movements. improved  3. History of prior extensive left-sided strokes. Plus fall at home  4. Vascular dementia. moderate  5. Hypertension. Stable  6.right ankle pain-get xray    Await possible NH plan for d/c versus home  Family prefers facility  Her dementia is advnacing  Continue seroquel and help with ADL's         Disposition :as above  Patient Active Problem List   Diagnosis Code    Hemiballismus G25.5    Confusion R41.0    Hypertension I10    Wheelchair bound Z99.3    Fall at home, sequela W19. Arielle Amato, Y92.009    History of stroke Z86.73       Subjective:    She is still confused per daughter  C/o pain all over  Grimaces and cries when I touch her right ankle    Review of systems:     Too confused to provide reliable ROS      Vital signs/Intake and Output:  Visit Vitals  /62 (BP 1 Location: Left arm, BP Patient Position: At rest;Supine)   Pulse 71   Temp 98.5 °F (36.9 °C)   Resp 18   Ht 5' 2\" (1.575 m)   Wt 68.6 kg (151 lb 3.2 oz)   SpO2 95%   BMI 27.65 kg/m²     Current Shift:  06/04 0701 - 06/04 1900  In: 100 [P.O.:100]  Out: -   Last three shifts:  06/02 1901 - 06/04 0700  In: 120 [P.O.:120]  Out: -     Exam:    General:demented  female, NAD  Head/Neck: NCAT, supple, No masses, No lymphadenopathy  CVS:Regular rate and rhythm, no M/R/G, S1/S2 heard, no thrill  Lungs:Clear to auscultation bilaterally, no wheezes, rhonchi, or rales  Abdomen: Soft, Nontender, No distention, Normal Bowel sounds, No hepatomegaly  Extremities: tender mild edema Right ankle with muscle contracture  Neuro:grossly normal , follows commands  Psych:confused                Labs: Results:       Chemistry Recent Labs     06/03/19  0400 06/02/19  0320   * 131*    140   K 3.7 3.8    104   CO2 27 27   BUN 15 21*   CREA 1.11 1.10   CA 8.8 8.7   AGAP 9 9   BUCR 14 19   AP 91 86   TP 7.4 7.0   ALB 3.1* 2.9*   GLOB 4.3* 4.1*   AGRAT 0.7* 0.7*      CBC w/Diff Recent Labs     06/03/19  0400 06/02/19  0320   WBC 9.2 9.1   RBC 3.91* 3.78*   HGB 11.6* 11.1*   HCT 35.8 34.5*    254      Cardiac Enzymes No results for input(s): CPK, CKND1, JAMEE in the last 72 hours. No lab exists for component: CKRMB, TROIP   Coagulation No results for input(s): PTP, INR, APTT in the last 72 hours. No lab exists for component: INREXT    Lipid Panel Lab Results   Component Value Date/Time    Cholesterol, total 111 05/30/2019 05:55 AM    HDL Cholesterol 37 (L) 05/30/2019 05:55 AM    LDL, calculated 50.4 05/30/2019 05:55 AM    VLDL, calculated 23.6 05/30/2019 05:55 AM    Triglyceride 118 05/30/2019 05:55 AM    CHOL/HDL Ratio 3.0 05/30/2019 05:55 AM      BNP No results for input(s): BNPP in the last 72 hours.    Liver Enzymes Recent Labs     06/03/19  0400   TP 7.4   ALB 3.1*   AP 91   SGOT 45*      Thyroid Studies Lab Results   Component Value Date/Time    TSH 1.10 06/01/2019 03:13 AM        Procedures/imaging: see electronic medical records for all procedures/Xrays and details which were not copied into this note but were reviewed prior to creation of Romaine Roberson MD

## 2019-06-04 NOTE — PROGRESS NOTES
Problem: Pressure Injury - Risk of  Goal: *Prevention of pressure injury  Description  Document Theodore Scale and appropriate interventions in the flowsheet. Outcome: Progressing Towards Goal     Problem: Patient Education: Go to Patient Education Activity  Goal: Patient/Family Education  Outcome: Progressing Towards Goal     Problem: Falls - Risk of  Goal: *Absence of Falls  Description  Document [facility] Fall Risk and appropriate interventions in the flowsheet.   Outcome: Progressing Towards Goal     Problem: Patient Education: Go to Patient Education Activity  Goal: Patient/Family Education  Outcome: Progressing Towards Goal     Problem: Patient Education: Go to Patient Education Activity  Goal: Patient/Family Education  Outcome: Progressing Towards Goal

## 2019-06-04 NOTE — PROGRESS NOTES
Transition of care  Met with patient at bedside family not present. Patient informed cm to call daughter   Lacey Balderas Daughter   [phone]      cm did call above as to d/c plans. Informed daughter that due to limited facilites accept patients insurance. That all of the facilities that have been submitted to including non sentara facilites have declined to accept patient. Daughter reports she does not know what else to do. States she feels her mother needs rehab. Cm did discuss the possibllity of private source payor. Daughter does not accept private payor. Daughter states she brought her mother to [facility] from [address] and she said she would be her mothers cg and now states she needs help with her mother. Informed her that patient would need payor source to go to rehab. Daughter feels that she cannot take care of patient at home. Cm asked her what would be the d/c plan for her mother. Nahum has offered [facility]. Sheela Davila expressed she is upset that she feels she should be able to go to rehab. Provided her with information and explained ot her cm is available to assist but she would have to have payor source. Nahum has called Camryn Baron at [phone] and left  for her to return call about patients daughter wants rehab but none accepting. Cm will send out further to [facility]. Daughter upset states she \"will take her mother home\". [address] telephone call with Taylor Li. Explained above to her and daughter wants her to have rehab however due to no coverage except for sentara and no accepting sentara facilites. Camryn Baron informed cm she would call around and then talk to her supervisor for further advisement  [phone] telephone call to flori informed her of above converstation waiting for furthr advisement. Informed her that it would be up to the insurance and if there is accepting faciites.  She shaid \"thank you\" and terminated the phone call cm will continue to follow  1430 telephone call with [place] with alfredito she informed c of area snf that are in the network [facility], [address], [phone] is in e network and [facility] at [address], [address] [facility]. Report to [phone]. Is in the network. Nahum [facility] and [facility] and informed her that [facility] had been called and they informed cm n[facility] is in the network. bassam asked ot have plateint sent to them again and she would try again. Nahum also called Chiki Crews for [facility] she states the facility is not out of the network but the MD there is she will try to see if they could accommodate or maybe another consulate facility and she will let cm know.  Nahum will continue to follow and updte md and family as to information

## 2019-06-04 NOTE — PROGRESS NOTES
Problem: Mobility Impaired (Adult and Pediatric)  Goal: *Acute Goals and Plan of Care (Insert Text)  Description  Physical Therapy Goals   Initiated 6/4/2019 and to be accomplished within 5 day(s)  1. Patient will move from supine <> sit with CGA-S in prep for out of bed activity and change of position. 2.  Patient will perform sit<> stand with CGA-S with LRAD in prep for transfers/ambulation. 3.  Patient will transfer from bed <> chair with CGA-S with LRAD for time up in chair for completion of ADL activity. 4.  Patient will ambulate >50 feet with LRAD/CGA-S for improved functional mobility/safe discharge. Outcome: Progressing Towards Goal   PHYSICAL THERAPY EVALUATION    Patient: Eli Villaseñor (59 y.o. female)  Date: 6/4/2019  Primary Diagnosis: Hemiballismus [G25.5]  Confusion [R41.0]        Precautions: Fall    ASSESSMENT :  Based on the objective data described below, the patient presents with decrease independence w/ bed mobility, transfers, and gait. Pt seen in supine prior to session. Pt is alert and able to better follow commands. Pt speaks very little english so PMH unable to be obtain. Pt able to sit at the EOB demonstrates fair sitting balance. Pt able to stand w/ RW/GB to take a few side steps however pt reported she was tired during activity and forced herself back to sitting on the bed. Pt transferred back to supine after session, call bell and tray in reach, bed alarm donned, nurse notified after session. Patient will benefit from skilled intervention to address the above impairments. Patients rehabilitation potential is considered to be Fair  Factors which may influence rehabilitation potential include:   ? None noted  ? Mental ability/status  ? Medical condition  ? Home/family situation and support systems  ? Safety awareness  ? Pain tolerance/management  ? Other:      PLAN :  Recommendations and Planned Interventions:  ? Bed Mobility Training             ? Neuromuscular Re-Education  ? Transfer Training                   ? Orthotic/Prosthetic Training  ? Gait Training                          ? Modalities  ? Therapeutic Exercises          ? Edema Management/Control  ? Therapeutic Activities            ? Patient and Family Training/Education  ? Other (comment):    Frequency/Duration: Patient will be followed by physical therapy 1-2 times per day to address goals. Discharge Recommendations: Rehab   Further Equipment Recommendations for Discharge: TBD by rehab     SUBJECTIVE:   Patient stated Becca Carrington you so much.     OBJECTIVE DATA SUMMARY:     Past Medical History:   Diagnosis Date    Hyperlipemia     Stroke [facility])      Past Surgical History:   Procedure Laterality Date    HX KNEE ARTHROSCOPY      right hip     Barriers to Learning/Limitations: yes;  physical  Compensate with: Verbal Cues and Tactile Cues  Prior Level of Function/Home Situation:  Home Situation  Home Environment: Private residence  # Steps to Enter: 2  One/Two Story Residence: One story  Living Alone: No  Support Systems: Child(radhika)  Patient Expects to be Discharged to[de-identified] Unknown  Current DME Used/Available at Home: Wheelchair  Critical Behavior:  Neurologic State: Alert  Orientation Level: Disoriented to place; Disoriented to situation;Disoriented to time  Cognition: Appropriate for age attention/concentration; Appropriate safety awareness; Impaired decision making; Follows commands  Psychosocial  Patient Behaviors: Altered mental status;Confused; Cooperative  Family  Behaviors: Calm; Cooperative; Appropriate for situation  Visitor Behaviors: Calm; Cooperative; Appropriate for situation  Needs Expressed: Cultural;Educational;Emotional;Nutritional;Social/Environmental  Purposeful Interaction: (minimal purposeful interaction with pt purposeful with dtr)  Pt Identified Daily Priority: Clinical issues (comment); Communication issues (comment); Family issues (comment); Social issues (comment)  Caritas Process: Nurture loving kindness;Enable harrison/hope;Establish trust;Nurture spiritual self;Teaching/learning; Attend basic human needs;Create healing environment;Supportive expression;Creative use of self  Caring Interventions: Reassure; Therapeutic modalities  Reassure: Therapeutic listening; Informing; Instilling harrison and hope;Support family;Quiet presence; Sit with patient;Caring rounds;Story tellings  Therapeutic Modalities: Deep breathing; Intentional therapeutic touch  Skin Condition/Temp: Warm  Family  Behaviors: Calm; Cooperative; Appropriate for situation  Skin Integrity: Wound (add Wound LDA)  Skin Integumentary  Skin Color: Appropriate for ethnicity  Skin Condition/Temp: Warm  Skin Integrity: Wound (add Wound LDA)  Turgor: Non-tenting  Hair Growth: Absent  Varicosities: Absent  Strength:    Strength: Generally decreased, functional  Range Of Motion:  AROM: Generally decreased, functional  Functional Mobility:  Bed Mobility:  Supine to Sit: Contact guard assistance  Sit to Supine: Contact guard assistance;Minimum assistance  Scooting: Minimum assistance  Transfers:  Sit to Stand: Maximum assistance;Assist x2  Stand to Sit: Maximum assistance;Assist x2  Balance:   Sitting: Intact  Standing: Impaired; With support  Standing - Static: Poor  Standing - Dynamic : Poor  Ambulation/Gait Training:  Distance (ft): 1 Feet (ft)([facility]  Assistive Device: Gait belt;Walker, rolling  Ambulation - Level of Assistance: Maximum assistance;Assist x2  Gait Description (WDL): Exceptions to WDL  Gait Abnormalities: Decreased step clearance;Shuffling gait  Base of Support: Widened  Speed/Ines: Shuffled; Slow  Step Length: Left shortened;Right shortened  Swing Pattern: Left asymmetrical;Right asymmetrical  Pain:  Pain Scale 1: Numeric (0 - 10)  Pain Intensity 1: 0  Activity Tolerance:   Fair  Please refer to the flowsheet for vital signs taken during this treatment. After treatment:   ?         Patient left in no apparent distress sitting up in chair  ? Patient left in no apparent distress in bed  ? Call bell left within reach  ? Nursing notified  ? Caregiver present  ? Bed alarm activated    COMMUNICATION/EDUCATION:   ?         Fall prevention education was provided and the patient/caregiver indicated understanding. ? Patient/family have participated as able in goal setting and plan of care. ?         Patient/family agree to work toward stated goals and plan of care. ?         Patient understands intent and goals of therapy, but is neutral about his/her participation. ? Patient is unable to participate in goal setting and plan of care.     Thank you for this referral.  Velvet Zacarias, PT   Time Calculation: 8 mins   Eval Complexity: History: MEDIUM  Complexity : 1-2 comorbidities / personal factors will impact the outcome/ POC Exam:LOW Complexity : 1-2 Standardized tests and measures addressing body structure, function, activity limitation and / or participation in recreation  Presentation: LOW Complexity : Stable, uncomplicated  Clinical Decision Making:Medium Complexity ambulate <30 ft  Overall Complexity:MEDIUM

## 2019-06-04 NOTE — ROUTINE PROCESS
Bedside and Verbal shift change report given to Allan Mcgee RN and [address] [place] [address]  (oncoming nurses) by Carroll Liu RN  (offgoing nurse). Report given with SBAR, Kardex, Intake/Output and Recent Results.

## 2019-06-04 NOTE — PROGRESS NOTES
NUTRITION FOLLOW-UP    RECOMMENDATIONS/PLAN:   -Other: Continue w/ POC  Monitor labs/lytes, PO intakes, skin integrity, wt, fluid status, BM    NUTRITION ASSESSMENT:   Client Update: 69 yrs old Female with encephalopathy, right ankle pain-xray ordered, vascular dementia. FOOD/NUTRITION INTAKE   Diet Order:  Cardiac Deangelo BID   Food Allergies: NKFA/  Average PO Intake:      Patient Vitals for the past 100 hrs:   % Diet Eaten   06/04/19 0849 75 %   06/02/19 0917 10 %   06/02/19 0606 0 %   06/01/19 1810 50 %   06/01/19 1430 90 %   06/01/19 1041 50 %   06/01/19 0603 0 %   05/31/19 1142 100 %   05/31/19 0812 100 %      Pertinent Medications:  [x] Reviewed; vit c, MVI, miralax,   Electrolyte Replacement Protocol: []K []Mg []PO4  Insulin:  []SSI  []Pre-meal   []Basal    []Drip  []None  Cultural/Confucianism Food Preferences: None Identified    BIOCHEMICAL DATA & MEDICAL TESTS  Pertinent Labs:  [x] Reviewed; ANTHROPOMETRICS  Height: 5' 2\" (157.5 cm)       Weight: 68.6 kg (151 lb 3.2 oz)         BMI: 27.6 kg/m^2 overweight (25.0%-29.9% BMI)   Adm Weight: 140 lbs                Weight change: +11lbs  Adjusted Body Weight: n/a     NUTRITION-FOCUSED PHYSICAL ASSESSMENT  Skin: wound  POA   GI: No BM    NUTRITION PRESCRIPTION  Calories: 7025-5765 kcal/day based on 30-35kcal/kg  Protein: 77-96 g/day based on 1.2-1.5 g/kg  Fluid: 1920-2240ml/day based on 1 kcal/ml          NUTRITION DIAGNOSES:   1. At risk for inadequate oral intake related to confusion as evidence by MD note     NUTRITION INTERVENTIONS:   INTERVENTIONS:                                                                                         GOALS:  1. Other: Continue w/ POC 1.  Encourage PO intake >50% at all meals by next review 4 days              LEARNING NEEDS (Diet, Supplementation, Food/Nutrient-Drug Interaction):   [] None Identified   [] Education provided/documented      Identified and patient: [] Declined   [x] Was not appropriate/indicated NUTRITION MONITORING /EVALUATION:   Follow PO intake  Monitor wt  Monitor renal labs, electrolytes, fluid status     Previous Recommendations Implemented: yes        Previous Goals Met:  yes -      [] Participated in Interdisciplinary Rounds    [x] Interdisciplinary Care Plan Reviewed  DISCHARGE NUTRITION RECOMMENDATIONS ADDRESSED:      [x] To be determined closer to discharge    NUTRITION RISK:           [x] At risk                        [] Not currently at risk        Will follow-up per policy.   Betsy Erazo 1

## 2019-06-05 LAB — T PALLIDUM AB SER QL IF: REACTIVE

## 2019-06-05 PROCEDURE — 92526 ORAL FUNCTION THERAPY: CPT

## 2019-06-05 PROCEDURE — 74011250637 HC RX REV CODE- 250/637: Performed by: PHYSICIAN ASSISTANT

## 2019-06-05 PROCEDURE — 65660000000 HC RM CCU STEPDOWN

## 2019-06-05 PROCEDURE — 74011250637 HC RX REV CODE- 250/637: Performed by: FAMILY MEDICINE

## 2019-06-05 PROCEDURE — 74011250637 HC RX REV CODE- 250/637: Performed by: HOSPITALIST

## 2019-06-05 PROCEDURE — 97530 THERAPEUTIC ACTIVITIES: CPT

## 2019-06-05 PROCEDURE — 77030037878 HC DRSG MEPILEX >48IN BORD MOLN -B

## 2019-06-05 PROCEDURE — 97116 GAIT TRAINING THERAPY: CPT

## 2019-06-05 PROCEDURE — 74011250636 HC RX REV CODE- 250/636: Performed by: HOSPITALIST

## 2019-06-05 RX ORDER — QUETIAPINE FUMARATE 25 MG/1
50 TABLET, FILM COATED ORAL
Status: DISCONTINUED | OUTPATIENT
Start: 2019-06-05 | End: 2019-06-06 | Stop reason: HOSPADM

## 2019-06-05 RX ADMIN — POLYETHYLENE GLYCOL 3350 17 G: 17 POWDER, FOR SOLUTION ORAL at 10:16

## 2019-06-05 RX ADMIN — ENOXAPARIN SODIUM 40 MG: 40 INJECTION SUBCUTANEOUS at 13:33

## 2019-06-05 RX ADMIN — ASPIRIN 81 MG 81 MG: 81 TABLET ORAL at 10:16

## 2019-06-05 RX ADMIN — QUETIAPINE FUMARATE 50 MG: 25 TABLET ORAL at 22:04

## 2019-06-05 RX ADMIN — ATORVASTATIN CALCIUM 20 MG: 20 TABLET, FILM COATED ORAL at 10:16

## 2019-06-05 RX ADMIN — Medication 500 MG: at 10:16

## 2019-06-05 RX ADMIN — MULTIPLE VITAMINS W/ MINERALS TAB 1 TABLET: TAB at 10:16

## 2019-06-05 RX ADMIN — Medication 5 MG: at 22:04

## 2019-06-05 RX ADMIN — Medication 10 ML: at 10:20

## 2019-06-05 NOTE — ROUTINE PROCESS
Bedside and Verbal shift change report given to Valer[place] (oncoming nurse) by Liudmila Yancey RN   (offgoing nurse). Report included the following information SBAR, Kardex and MAR.

## 2019-06-05 NOTE — PROGRESS NOTES
Hospitalist Progress Note    Patient: Yelena Bowden MRN: [id number]  CSN: [id number]    YOB: 1945  Age: 69 y.o. Sex: female    DOA: 5/29/2019 LOS:  LOS: 7 days          Chief Complaint:    Encephalopathy    Assessment/Plan     1. Encephalopathy  2. Right arm abnormal movements  3. History of extensive left-sided strokes  4. Vascular dementia  5. Hypertension  6. Right ankle pain    1. Continued confusion. Increase Seroquel to 50mg qhs. No family at bedside for reorientation. 2. Improved, no changes. 3. No changes. 4. Seroquel as above. 5. Stable, no changes. 6. Xray unremarkable for any acute osseous abnormality. DVT prophylaxis with Lovenox. Disposition: Awaiting insurance authorization for SNF. Patient Active Problem List   Diagnosis Code    Hemiballismus G25.5    Confusion R41.0    Hypertension I10    Wheelchair bound Z99.3    Fall at home, sequela W19. XXXS, Y92.009    History of stroke Z86.73       Subjective:    Unable to obtain. Review of systems:    Does not participate in questioning.       Vital signs/Intake and Output:  Visit Vitals  /78 (BP 1 Location: Left arm, BP Patient Position: At rest)   Pulse 77   Temp 97.5 °F (36.4 °C)   Resp 17   Ht 5' 2\" (1.575 m)   Wt 69.7 kg (153 lb 10.6 oz)   SpO2 96%   BMI 28.10 kg/m²     Current Shift:  06/05 0701 - 06/05 1900  In: 480 [P.O.:480]  Out: -   Last three shifts:  06/03 1901 - 06/05 0700  In: 340 [P.O.:340]  Out: 0     Exam:    General: Elderly,  female, demented, alert, NAD  Head/Neck: NCAT, supple, No masses, No lymphadenopathy  CVS:Regular rate and rhythm, no M/R/G, S1/S2 heard, no thrill  Lungs:Clear to auscultation bilaterally, no wheezes, rhonchi, or rales  Abdomen: Soft, Nontender, No distention, Normal Bowel sounds, No hepatomegaly  Extremities: Right ankle with muscle contracture  Skin:normal texture and turgor, no rashes, no lesions  Neuro:grossly normal , moving limbs spontaneously  Psych:confused                Labs: Results:       Chemistry Recent Labs     06/03/19  0400   *      K 3.7      CO2 27   BUN 15   CREA 1.11   CA 8.8   AGAP 9   BUCR 14   AP 91   TP 7.4   ALB 3.1*   GLOB 4.3*   AGRAT 0.7*      CBC w/Diff Recent Labs     06/03/19  0400   WBC 9.2   RBC 3.91*   HGB 11.6*   HCT 35.8         Cardiac Enzymes No results for input(s): CPK, CKND1, JAMEE in the last 72 hours. No lab exists for component: CKRMB, TROIP   Coagulation No results for input(s): PTP, INR, APTT in the last 72 hours. No lab exists for component: INREXT    Lipid Panel Lab Results   Component Value Date/Time    Cholesterol, total 111 05/30/2019 05:55 AM    HDL Cholesterol 37 (L) 05/30/2019 05:55 AM    LDL, calculated 50.4 05/30/2019 05:55 AM    VLDL, calculated 23.6 05/30/2019 05:55 AM    Triglyceride 118 05/30/2019 05:55 AM    CHOL/HDL Ratio 3.0 05/30/2019 05:55 AM      BNP No results for input(s): BNPP in the last 72 hours.    Liver Enzymes Recent Labs     06/03/19  0400   TP 7.4   ALB 3.1*   AP 91   SGOT 45*      Thyroid Studies Lab Results   Component Value Date/Time    TSH 1.10 06/01/2019 03:13 AM        Procedures/imaging: see electronic medical records for all procedures/Xrays and details which were not copied into this note but were reviewed prior to creation of [address] Sebastien Sun, PA-C

## 2019-06-05 NOTE — PROGRESS NOTES
Pt remained stable throughout. Restless and talkative. No acute distress noted. Pt did not sleep. Continued to pull off clothes and brief.  Pt also pulled out both IVs.

## 2019-06-05 NOTE — PROGRESS NOTES
0730: Bedside shift change report given to Andreia Roa RN (oncoming nurse) by ARTI Eckert RN (offgoing nurse). Report included the following information Kardex, Med Rec Status and Alarm Parameters . 7820: pt dtr at the bedside and assist this nurse to stand and pivot pt to the [facility], pt tolerates stand, walking two steps and pivot to the [facility] well, assisted back to bed with 2 assist, continue to monitor pt, dtr request to speak with , made dtr aware matter will be addressed  Andreia Roa RN     0900: dtr leaves unit and 0900 and reminds this nurse to talk with case management, made dtr aware matter will be addressed  Andreia Roa RN     [phone]: FADI Marquez and MD Merle Kong in to assess pt, pt continues to have continuous confusion and starting to have restlessness, MD Merle Kong makes this nurse aware assessment will be done of pt treatment and made accordingly  Andreia Roa RN     1100:  Mackenzie Montalvo made aware dtr request to communicate with her,  makes this nurse aware matter will be addressed   Andreia Roa RN    1300: Neurologist MD Jimbo Lee in to assess pt and makes this nurse aware pt is having hallucinations, MD makes this nurse aware she will further discuss treatment with hospitalist   Andreia Roa RN     9497: Bedside shift change report given to JEFF Montoya RN (oncoming nurse) by Andreia Roa RN (offgoing nurse). Report included the following information Kardex, Cardiac Rhythm no telemetry and Alarm Parameters .

## 2019-06-05 NOTE — PROGRESS NOTES
Problem: Mobility Impaired (Adult and Pediatric)  Goal: *Acute Goals and Plan of Care (Insert Text)  Description  Physical Therapy Goals   Initiated 6/4/2019 and to be accomplished within 5 day(s)  1. Patient will move from supine <> sit with CGA-S in prep for out of bed activity and change of position. 2.  Patient will perform sit<> stand with CGA-S with LRAD in prep for transfers/ambulation. 3.  Patient will transfer from bed <> chair with CGA-S with LRAD for time up in chair for completion of ADL activity. 4.  Patient will ambulate >50 feet with LRAD/CGA-S for improved functional mobility/safe discharge. Outcome: Progressing Towards Goal   PHYSICAL THERAPY TREATMENT    Patient: Chacho Mcintosh (70 y.o. female)  Date: 6/5/2019  Diagnosis: Hemiballismus [G25.5]  Confusion [R41.0] <principal problem not specified>       Precautions: Fall   Chart, physical therapy assessment, plan of care and goals were reviewed. ASSESSMENT:  Pt found in bed, with gown off and swatting at unseen stimuli near foot of bed. Pt responded positively to physical and visual cuing. Able to transition top EOB and progress to ambulation with max assistance. Assistance is provided for RW management and total assist is needed for R LE advance. Pt family note that pt was independent with transfers to W/c PTA, but is not capable of that at this time. Pt's mentation and language barrier are limiting factors, but pt may be able to return to PLOF. Progression toward goals:  ?      Improving appropriately and progressing toward goals  ? Improving slowly and progressing toward goals  ? Not making progress toward goals and plan of care will be adjusted     PLAN:  Patient continues to benefit from skilled intervention to address the above impairments. Continue treatment per established plan of care.   Discharge Recommendations:  Meño Castro  Further Equipment Recommendations for Discharge:  gait belt and rolling walker     SUBJECTIVE:   Patient stated ? Thank you very much. ?    OBJECTIVE DATA SUMMARY:   Critical Behavior:  Neurologic State: Alert  Orientation Level: Oriented to person, Oriented to place, Disoriented to situation, Disoriented to time  Cognition: Follows commands  Safety/Judgement: Decreased awareness of environment, Decreased awareness of need for assistance, Decreased awareness of need for safety, Decreased insight into deficits  Functional Mobility Training:  Bed Mobility:  Rolling: Minimum assistance  Supine to Sit: Minimum assistance  Sit to Supine: Moderate assistance  Scooting: Moderate assistance  Transfers:  Sit to Stand: Maximum assistance  Stand to Sit: Maximum assistance  Balance:  Sitting: Intact  Standing: Impaired; With support  Standing - Static: Poor  Standing - Dynamic : Poor  Ambulation/Gait Training:  Distance (ft): 10 Feet (ft)  Assistive Device: Gait belt;Walker, rolling  Ambulation - Level of Assistance: Maximum assistance;Assist x2  Gait Abnormalities: Decreased step clearance;Shuffling gait  Base of Support: Widened  Speed/Ines: Shuffled  Step Length: Left shortened;Right shortened  Swing Pattern: Left asymmetrical;Right asymmetrical  Therapeutic Exercises:   Unable to follow commands for ex   Pain:  No visible signs of pain prior, during or after session  Activity Tolerance:   Fair-/poor  Please refer to the flowsheet for vital signs taken during this treatment. After treatment:   ? Patient left in no apparent distress sitting up in chair  ? Patient left in no apparent distress in bed  ? Call bell left within reach  ? Nursing notified  ? Caregiver present  ?  Bed alarm activated      Percell Mercury, PTA   Time Calculation: 23 mins

## 2019-06-05 NOTE — PROGRESS NOTES
Transition of care[de-identified] anticipate if gets authorization to snf  Cm has spoken with [facility] [phone]. She has informed cm she has not at 0825 not received the referral from [facility] states once she has it she will start working on authorization process. Telephone call to Volodymyr Rosales [facility] 3979 she informed cm they sent the referral this morning. 0845 telephone call with daughter that if [facility] does gt authorization will plan for transfer. Daughter thanked cm and will continue to follow  D/c plan waiiting on authorization   1330 telephone call with [facility] she needs PT notes that reflect patients prior level of functioning and asked to please fax it to [phone] cms will fax once the note is in emr.

## 2019-06-06 VITALS
TEMPERATURE: 97.8 F | OXYGEN SATURATION: 97 % | HEIGHT: 62 IN | HEART RATE: 72 BPM | SYSTOLIC BLOOD PRESSURE: 121 MMHG | DIASTOLIC BLOOD PRESSURE: 63 MMHG | BODY MASS INDEX: 26.81 KG/M2 | RESPIRATION RATE: 16 BRPM | WEIGHT: 145.7 LBS

## 2019-06-06 PROBLEM — F03.90 DEMENTIA (HCC): Chronic | Status: ACTIVE | Noted: 2019-06-06

## 2019-06-06 PROBLEM — F03.90 DEMENTIA (HCC): Status: ACTIVE | Noted: 2019-06-06

## 2019-06-06 PROCEDURE — 74011250636 HC RX REV CODE- 250/636: Performed by: HOSPITALIST

## 2019-06-06 PROCEDURE — 74011250637 HC RX REV CODE- 250/637: Performed by: HOSPITALIST

## 2019-06-06 PROCEDURE — 74011250637 HC RX REV CODE- 250/637: Performed by: PSYCHIATRY & NEUROLOGY

## 2019-06-06 RX ORDER — QUETIAPINE FUMARATE 50 MG/1
25 TABLET, FILM COATED ORAL
Qty: 1 TAB | Refills: 0 | Status: SHIPPED
Start: 2019-06-06 | End: 2019-06-06 | Stop reason: SDUPTHER

## 2019-06-06 RX ORDER — HALOPERIDOL 0.5 MG/1
0.5 TABLET ORAL
Qty: 1 TAB | Refills: 0 | Status: SHIPPED | OUTPATIENT
Start: 2019-06-06

## 2019-06-06 RX ORDER — DONEPEZIL HYDROCHLORIDE 5 MG/1
5 TABLET, FILM COATED ORAL
Qty: 1 TAB | Refills: 0 | Status: SHIPPED
Start: 2019-06-06

## 2019-06-06 RX ORDER — QUETIAPINE FUMARATE 25 MG/1
25 TABLET, FILM COATED ORAL DAILY
Qty: 1 TAB | Refills: 0 | Status: SHIPPED
Start: 2019-06-07

## 2019-06-06 RX ORDER — QUETIAPINE FUMARATE 50 MG/1
50 TABLET, FILM COATED ORAL
Qty: 1 TAB | Refills: 0 | Status: SHIPPED
Start: 2019-06-06

## 2019-06-06 RX ORDER — QUETIAPINE FUMARATE 25 MG/1
25 TABLET, FILM COATED ORAL DAILY
Status: DISCONTINUED | OUTPATIENT
Start: 2019-06-06 | End: 2019-06-06 | Stop reason: HOSPADM

## 2019-06-06 RX ORDER — HALOPERIDOL 1 MG/1
0.5 TABLET ORAL DAILY PRN
Status: DISCONTINUED | OUTPATIENT
Start: 2019-06-06 | End: 2019-06-06 | Stop reason: HOSPADM

## 2019-06-06 RX ADMIN — ATORVASTATIN CALCIUM 20 MG: 20 TABLET, FILM COATED ORAL at 09:15

## 2019-06-06 RX ADMIN — Medication 500 MG: at 09:16

## 2019-06-06 RX ADMIN — ASPIRIN 81 MG 81 MG: 81 TABLET ORAL at 09:16

## 2019-06-06 RX ADMIN — QUETIAPINE FUMARATE 25 MG: 25 TABLET ORAL at 12:04

## 2019-06-06 RX ADMIN — ENOXAPARIN SODIUM 40 MG: 40 INJECTION SUBCUTANEOUS at 14:53

## 2019-06-06 RX ADMIN — POLYETHYLENE GLYCOL 3350 17 G: 17 POWDER, FOR SOLUTION ORAL at 09:15

## 2019-06-06 RX ADMIN — MULTIPLE VITAMINS W/ MINERALS TAB 1 TABLET: TAB at 09:15

## 2019-06-06 NOTE — PROGRESS NOTES
Problem: Patient Education: Go to Patient Education Activity  Goal: Patient/Family Education  Outcome: Resolved/Met

## 2019-06-06 NOTE — PROGRESS NOTES
1900 Report received from Andreia Roa RN. 2010 Daughter at bedside. Patient slept throughout the night. 0700 Bedside shift change report given to ARTI Wiley RN (oncoming nurse) by Preeti Danielle. Donna Montoya RN  (offgoing nurse). Report included the following information SBAR and Kardex.

## 2019-06-06 NOTE — PROGRESS NOTES
Transition of care: waiting insurance authorization  Met with patient and her daughter at bedside. Updated daughter. She states she is doing so much better today. Cm states her sister did not update her cm asked for this daughters phone number since they do not communicate [phone] [facility]. Informed her tht since they wanted her to go to snf for rehab [facility] has applied for authorization if this does occur patient would be going to [facility] however, please note if no payor source and family unable to pay. She would d/c home. Daughter states she lives with the other daughter and she is aware. Cm will continue to update  0926 telephone call with [facility] [phone] at [address] [facility]  she informed cm she is just waiting for pt notes informed her cm hasd sent yesterday and confirmed the fax and it does verify was sent. Mariam Keith informed cm that it may be sent but she has not received on her board or posted yet she will continue to watch out. cm will continue to follow. 1244 received confirmation from [facility] at [facility] she has received authorization for this patient to come today. Nenita Robison  is to set up transportation  RN please call report to  CHERYL Weber at [address] [phone] for report. Prior to patient leaving [address] [place] [address]   Telephone call to patients daughter Master Savage on file she made aware and verbalized understanding and provided with address and phone number for [facility]. States she will inform her sister. Informed  Her transportation to be set up daughter states she cannot transport her. She is awware she may receive a bill for transport. Daughter agrees with d/c plan continue with plan to [facility] today. RN please call report to CHERYL Weber at [address] [phone] for report. Prior to leaving [facility]  Please include all hard scripts for controlled substances, med rec and dc summary in packet.  Please medicate for pain prior to dc if possible and needed to help offset delay when patient first arrives to facility. [facility] has set up transportation for [address] at [facility] is aware  Care Management Interventions  PCP Verified by CM:  Yes  Mode of Transport at Discharge: BLS  Transition of Care Consult (CM Consult): SNF  Partner SNF: Yes  Physical Therapy Consult: Yes  Occupational Therapy Consult: Yes  Current Support Network: Relative's Home  Confirm Follow Up Transport: Family  Plan discussed with Pt/Family/Caregiver: Yes  Freedom of Choice Offered: Yes  Discharge Location  Discharge Placement: Skilled nursing facility

## 2019-06-06 NOTE — PROGRESS NOTES
Problem: Pressure Injury - Risk of  Goal: *Prevention of pressure injury  Description  Document Theodore Scale and appropriate interventions in the flowsheet.   6/5/2019 2334 by Annmarie Duran RN  Outcome: Progressing Towards Goal  6/5/2019 2333 by Annmarie Duran, RN  Outcome: Progressing Towards Goal

## 2019-06-06 NOTE — PROGRESS NOTES
0700: Assumed care of pt from [address]  0688: TRANSFER - OUT REPORT:    Verbal report given to Juliana Lazo (name) on Irvin Damon  being transferred to (unit) for routine progression of care    [employer]      Report consisted of patients Situation, Background, Assessment and   Recommendations(SBAR). Information from the following report(s) SBAR, Kardex, Intake/Output and MAR was reviewed with the receiving nurse. Lines:       Opportunity for questions and clarification was provided.       Patient transported with:   [employer]

## 2019-06-06 NOTE — DISCHARGE SUMMARY
Discharge Summary    Patient: Rob Pinon MRN: [id number]  CSN: [id number]    YOB: 1945  Age: 69 y.o. Sex: female    DOA: 5/29/2019 LOS:  LOS: 8 days   Discharge Date:      Primary Care Provider:  Tomasa Pearson MD    Admission Diagnoses: Hemiballismus [G25.5]  Confusion [R41.0]    Discharge Diagnoses:    Problem List as of 6/6/2019 Never Reviewed          Codes Class Noted - Resolved    Dementia ICD-10-CM: F03.90  ICD-9-CM: 294.20  6/6/2019 - Present        Hemiballismus ICD-10-CM: G25.5  ICD-9-CM: 333.5  5/29/2019 - Present        Confusion ICD-10-CM: R41.0  ICD-9-CM: 298.9  5/29/2019 - Present        Hypertension ICD-10-CM: I10  ICD-9-CM: 401.9  5/29/2019 - Present        Wheelchair bound ICD-10-CM: Z99.3  ICD-9-CM: V46.3  5/29/2019 - Present        Fall at home, sequela ICD-10-CM: W19. Jeancarlos Toney, Y92.009  ICD-9-CM: 909.4, E929.3  5/29/2019 - Present        History of stroke ICD-10-CM: Z86.73  ICD-9-CM: V12.54  5/29/2019 - Present              Discharge Medications:     Current Discharge Medication List      START taking these medications    Details   haloperidol (HALDOL) 0.5 mg tablet Take 1 Tab by mouth daily as needed for Other (Agitation). Qty: 1 Tab, Refills: 0    Associated Diagnoses: Dementia without behavioral disturbance, unspecified dementia type      !! QUEtiapine (SEROQUEL) 25 mg tablet Take 1 Tab by mouth daily. Qty: 1 Tab, Refills: 0      donepezil (ARICEPT) 5 mg tablet Take 1 Tab by mouth nightly. Qty: 1 Tab, Refills: 0      !! QUEtiapine (SEROQUEL) 50 mg tablet Take 1 Tab by mouth nightly. Qty: 1 Tab, Refills: 0      aspirin 81 mg chewable tablet Take 1 Tab by mouth daily. Qty: 2 Tab, Refills: 0      melatonin tab tablet Take 1 Tab by mouth nightly. Qty: 2 Tab, Refills: 0      multivitamin, tx-iron-ca-min (THERA-M W/ IRON) 9 mg iron-400 mcg tab tablet Take 1 Tab by mouth daily. Qty: 2 Tab, Refills: 0       !! - Potential duplicate medications found.  Please discuss with provider. CONTINUE these medications which have NOT CHANGED    Details   atorvastatin (LIPITOR) 20 mg tablet Take 20 mg by mouth daily. ergocalciferol (VITAMIN D2) 50,000 unit capsule Take 50,000 Units by mouth every seven (7) days. losartan (COZAAR) 50 mg tablet Take 50 mg by mouth daily. Discharge Condition: Stable    Procedures : EEG    Consults: Neurology      PHYSICAL EXAM    Visit Vitals  /63 (BP 1 Location: Left arm, BP Patient Position: Supine)   Pulse 72   Temp 97.8 °F (36.6 °C)   Resp 16   Ht 5' 2\" (1.575 m)   Wt 66.1 kg (145 lb 11.2 oz)   SpO2 97%   BMI 26.65 kg/m²     General: Elderly  female. Awake, cooperative, no acute distress. Demented.   HEENT: NC, Atraumatic. PERRLA, EOMI. Anicteric sclerae. Lungs:  CTA Bilaterally. No Wheezing/Rhonchi/Rales. Heart:  Regular  rhythm,  No murmur, No Rubs, No Gallops  Abdomen: Soft, Non distended, Non tender. +Bowel sounds,   Extremities: No c/c/e  Psych:   Not anxious or agitated. Neurologic:  No acute neurological deficits. Admission HPI : This is a 74-year-old [place] female with a past medical history of high blood pressure and hyperlipidemia who does not speak [place]. She came into the emergency room with her daughter because she had fallen from her wheelchair about 3 days ago. The day after the fall, the daughter noted that she could not feed herself as usual and had uncontrolled movement of her right arm and was also more confused. Her daughter thought it may clear up but she did not, and with that, she brought her into the ER today. She apparently had hit her head per the patient's report to her daughter when she fell from the wheelchair, but her daughter was not present when the fall happened. She found her down on the ground. There was no noted loss of consciousness or incontinence. She does not have a history of seizures.   In the emergency room, she was sent for MRI and CTA of the head and neck. Neurology has been consulted from the ER, and I visited with her daughter in the CT area and examined the patient. Unfortunately, the patient is unable to provide any history or participate in her exam as she has been given Ativan to have cooperation for the MRI. Hospital Course : \"This is a 72-year-old  female who is brought to the hospital on 05/29 because she had fallen from her wheelchair three days prior and then ultimately started becoming more confused per her daughter's report with right arm abnormal movements, worsening confusion. When she came into the emergency room, there was no acute evidence for new stroke. She has had prior extensive strokes, is wheelchair dependent, also from a chronic leg issue as well. She was seen by Neurology. We had extensive testing done for her here including an EEG, MRI of the brain. The MRI showed prior notable left-sided strokes, chronic disease, but no acute hemorrhage or infarction. She has had an EEG done, there was no evidence for acute seizure activity. She was treated empirically for seizures but then that was discontinued. Chest x-ray showed no acute cardiopulmonary process. White count has been normal since admission. Urinalysis was questionable for possible UTI on 29th, repeated on the 31st that came back negative. Culture was sent, it came back no growth for two days but she was treated empirically for a possible infection. Urine drug screen came back negative and she is otherwise improved. Her mental status is significantly improved as when initially she came in, she was delirious, making abnormal sounds, not making any sensible speech per her daughter who translates for her, blood sugars were unremarkable here and ultimately she has gotten better. She is now participating, she is cooperating, she says okay. She can actually say some English sentences to me this morning.   She is sitting on the bedside commode trying to have a bowel movement. Her daughter is present. We are arranging for skilled nursing for her as she has become more debilitated than usual and could benefit from rehabilitation.     Her temperature is 97.6, pulse 72, blood pressure 116/58, respiratory rate 16, SaO2 is 97% on room air. Elderly  female, awake and alert, in no acute distress, moving her right arm what I can see normally at this point. Her lungs are clear bilaterally. Cardiac exam, regular rate and rhythm. No murmur, rub or gallop. Her abdomen is soft, nontender, no distention. Her lower extremities, no edema noted. Of note, she did have an echocardiogram completed here that showed an EF of 61-65% with grade I left ventricular diastolic dysfunction.     She is medically stable to leave to go to a facility once arrangements are made and an accepting facility is found. \"    The above was written by Dr Chantelle Rogers on the original discharge summary dated 6/3/19. Since that time, no clinically significant changes have been made. Her RPR is nonreactive, however her T pallidum AB has returned positive. Her sexual history is unknown, there is no pathological rash. The case was briefly discussed with Dr Lyubov Costa who recommended a repeat assay with a repeat RPR and T pallidum AB as outpatient and to treat accordingly once these results have returned. Her family was advised of this recommendation. Final recommendations from neurology are to continue Seroquel 25mg daily, Seroquel 50mg nightly, start Aricept 5mg daily. The patient was advised to follow up with Dr Bette Rock as an outpatient. She will be discharged to SNF today in stable condition. Activity: Activity as tolerated    Diet: Cardiac Diet    Follow-up: PCP; Neurology    Disposition: SNF    Minutes spent on discharge: 38       Labs: Results:       Chemistry No results for input(s): GLU, NA, K, CL, CO2, BUN, CREA, CA, AGAP, BUCR, TBIL, GPT, AP, TP, ALB, GLOB, AGRAT in the last 72 hours.    CBC w/Diff No results for input(s): WBC, RBC, HGB, HCT, PLT, GRANS, LYMPH, EOS, HGBEXT, HCTEXT, PLTEXT, HGBEXT, HCTEXT, PLTEXT in the last 72 hours. Cardiac Enzymes No results for input(s): CPK, CKND1, JAMEE in the last 72 hours. No lab exists for component: CKRMB, TROIP   Coagulation No results for input(s): PTP, INR, APTT in the last 72 hours. No lab exists for component: INREXT, INREXT    Lipid Panel Lab Results   Component Value Date/Time    Cholesterol, total 111 05/30/2019 05:55 AM    HDL Cholesterol 37 (L) 05/30/2019 05:55 AM    LDL, calculated 50.4 05/30/2019 05:55 AM    VLDL, calculated 23.6 05/30/2019 05:55 AM    Triglyceride 118 05/30/2019 05:55 AM    CHOL/HDL Ratio 3.0 05/30/2019 05:55 AM      BNP No results for input(s): BNPP in the last 72 hours. Liver Enzymes No results for input(s): TP, ALB, TBIL, AP, SGOT, GPT in the last 72 hours. No lab exists for component: DBIL   Thyroid Studies Lab Results   Component Value Date/Time    TSH 1.10 06/01/2019 03:13 AM            Significant Diagnostic Studies: Xr Chest Pa Lat    Result Date: 5/31/2019  History: Confusion, rule out sepsis Views: Frontal and lateral views. Comparison study: None. Findings: Heart and mediastinum: Unremarkable. Lungs and pleura: Clear without consolidation or pleural effusion. Aorta: Mildly tortuous and partially calcified. Bones: Degenerative changes are seen throughout the spine. Impression: No evidence of an acute cardiopulmonary process. Xr Ankle Rt Ap/lat    Result Date: 6/4/2019  PROCEDURE: 2 views right ankle COMPARISON: None HISTORY: Ankle pain FINDINGS: There is chronic appearing deformity and arthrodesis of the tibiotalar joint space and subtalar joint space to the calcaneus. No acute fracture or dislocation identified on these images. Osteopenic changes present. No focal soft tissue abnormality identified. IMPRESSION: No acute osseous abnormality identified.  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 Mri Brain Wo Cont    Result Date: 5/29/2019  Brain MR without contrast: Indication: History of fall 3 days previous from wheelchair. Uncontrollable movements right upper extremity and decreased level of consciousness with increased confusion. Procedure: Sagittal spin echo T1, axial FSE FLAIR and T2 and axial diffusion weighted scanning was performed. An axial T2* scan optimized to detect hemosiderin and calcium was performed. Coronal spin echo T1and FSE T2 scans were also performed. No contrast was administered. Comparison exam: Head CT 5/29/2019. Findings: Diffusion: There are no areas of restricted diffusion, no acute infarction. The T2* scan shows no acute or chronic hemorrhage or abnormal calcifications. Brain parenchyma: [Substantial ex vacuo dilatation of the left lateral ventricle related to a likely chronic infarction in the superior medial left frontal lobe, an area of focal atrophy with substantial subcortical cystic encephalomalacia and some gliosis. Additional areas of in part fluid signal likely chronic lacunar infarction anterior limb internal capsule on the left and in the right thalamus are noted.] Confluent ischemic changes immediate periventricular white matter. There is substantial diffuse atrophy of the body of the corpus callosum. Likely areas of chronic infarction of bilateral paramedian upper anterior genu and bilateral body of the corpus callosum. The rostrum and splenium are present and relatively intact. No mass or mass effect. There is a right paracentral fluid signal lesion in the mid maged likely an area of chronic lacunar infarction. Likely fluid signal chronic area of infarction in the immediate periventricular right parietal lobe in part communicating with the lateral ventricle. Ventricles and sulci: Mild prominence of sulci in the perisylvian region. The temporal tips are mildly prominent. Extra axial: No extra axial fluid collection or mass.  Brain vasculature: Normal, no arterial vascular abnormality is noted. Craniocervical Junction: Normal. Extracranial and skull base:  Mucosal thickening in the paranasal sinuses. No air-fluid level. Impression: 1. No acute hemorrhage or acute infarction. No evidence of significant acute brain injury. 2. Substantial chronic infarction in superior medial left frontal lobe. Likely extensive chronic bilateral corpus callosum infarction. Chronic lacunar infarctions in the left anterior limb internal capsule, right thalamus, right parietal periventricular white matter and right paramedian maged. 3. Atrophy as described with ischemic white matter change. Ct Head Wo Cont    Result Date: 5/29/2019  EXAM: CT head INDICATION: Unwitnessed fall, increasing agitation. COMPARISON: None. TECHNIQUE: Axial CT imaging of the head was performed without intravenous contrast. One or more dose reduction techniques were used on this CT: automated exposure control, adjustment of the mAs and/or kVp according to patient size, and iterative reconstruction techniques. The specific techniques used on this CT exam have been documented in the patient's electronic medical record. Digital Imaging and Communications in Medicine (DICOM) format image data are available to nonaffiliated external healthcare facilities or entities on a secure, media free, reciprocally searchable basis with patient authorization for at least a 12-month period after this study. _______________ FINDINGS: BRAIN AND POSTERIOR FOSSA: There is mild cortical and cerebellar volume loss present. There is ex vacuo dilatation of the left lateral ventricle related to adjacent areas of encephalomalacia within the left frontal lobe and left centrum semiovale basilar cisterns are patent. Subcortical and periventricular white matter low-attenuation is present. Bilateral physiologic basal ganglia calcifications. There is no intracranial hemorrhage, mass effect, or midline shift.  Gray-white matter differentiation is within normal limits. EXTRA-AXIAL SPACES AND MENINGES: There are no abnormal extra-axial fluid collections. CALVARIUM: Intact. SINUSES: Imaged paranasal sinuses and mastoid air cells are clear. OTHER: None. _______________     IMPRESSION: 1. No acute intracranial abnormality. 2. Evidence of prior left frontal lobe infarcts with associated ex vacuo dilatation of the anterior horn of the left lateral ventricle. 3. Subcortical and periventricular white matter low-attenuation; favored to reflect sequela of chronic ischemic microvascular change. Cta Head Neck W Wo Cont    Addendum Date: 5/29/2019    Addendum: 4. Somewhat irregular small right upper lobe lung nodule. This could be an area of scarring. Chest CT for correlation is suggested. Result Date: 5/29/2019  Brain CT angiogram and Neck CT angiogram: Indication: Evaluate for stenosis. Possible infarction. Evaluate for source of embolus. History of recent fall with possible head injury. Uncontrolled subsequent movements of her right upper extremity and decreased level of consciousness with increased confusion. Not certain as to the reason why brain and neck vascular imaging was not performed at the time of brain MRI performed earlier. Procedure: Neck CT angiogram: Contrast was administered with a power injector. Axial thin section volume acquisition  scans were obtained timed for peak arterial enhancement. An automated triggering system was used. Axial images were generated. Angiographic coronal and sagittal reformations were also generated. Extensive 3-D separate workstation processing was performed by [employer]. Brain CT angiogram: Dedicated slab MIP overlapping angiographic reconstructions in the sagittal, axial and coronal plane of the brain component of the axial evaluation were also performed. Extensive 3-D separate workstation processing was performed by [employer].  One or more dose reduction techniques were used on this CT: automated exposure control, adjustment of the mAs and/or kVp according to patient size, and iterative reconstruction techniques. The specific techniques used on this CT exam have been documented in the patient's electronic medical record. Digital Imaging and Communications in Medicine (DICOM) format image data are available to nonaffiliated external healthcare facilities or entities on a secure, media free, reciprocally searchable basis with patient authorization for at least a 12-month period after this study. Comparison exam: Head CT and brain MRI 5/29/2019. Findings: Neck CTA: Any internal carotid stenosis described below uses NASCET criteria, minimal residual lumen diameter versus the non-tapering cervical internal carotid artery. Aortic Arch: Normal branching pattern. No proximal great vessel stenosis. Some ulcerative changes along the aortic arch without large aneurysm or definitive high-grade stenosis. Left carotid: No stenosis or other vascular abnormality. Right carotid:  Mild calcified plaque carotid bulb but no substantial diameter stenosis or other vascular abnormality. The vertebral arteries are left dominant. Right vertebral artery:  Is characterized by calcified subclavian plaque adjacent to the origin without significant stenosis of the right vertebral which is patent to the basilar. Left vertebral artery:  No stenosis or other vascular abnormality. Lung apices: There is a small nodule in the right lung apex measuring 3 mm with minimal linear density, possibly chronic scarring. On the coronal reformations the lesion has a potentially mildly spiculated appearance and measures 4 mm. No other suspicious lung nodule. Neck soft tissues: Lobulated prominence of the lingual tonsils and prominence of the palatine tonsils are symmetric likely reactive. No definite invasive mass or pathologic adenopathy with small likely reactive lymph nodes bilaterally.  Brain CTA: Carotid siphon and supraclinoid internal carotid artery: There is some calcified plaque in the carotid siphon but no definitive high-grade siphon stenosis. Mild stenosis of the junction of the cavernous and supraclinoid internal carotid on the right, felt to be less than 50%. There is a suspicion of a small contour abnormality projecting anterior of the anterior aspect of the right anterior cavernous carotid loop projecting anterior and just lateral, clearly intracavernous but potentially a tiny aneurysm very wide neck measuring 2 mm. No other supraclinoid internal carotid artery aneurysm or stenosis. M1 segment and proximal M2 segment MCA:  Mild-moderate mid right M1 segment stenosis with some irregularity of contour but no definitive high-grade stenosis substantial stenosis origin inferior division right M2 segment, clearly greater than 50%. No left M1 or proximal M2 segment stenosis. . A1 segment, anterior communicating artery and proximal A2 segments:  No significant stenosis or aneurysm. Vertebrobasilar system:  Variant anatomy is elongated V4 segment right vertebral artery with a very distal vertebral basilar junction. Dominant left vertebral. Fenestration proximal V4 segment right vertebral without associated aneurysm. Mild irregularity left P2 segment PCA. No definite high-grade PCA stenosis. Distal anterior cerebral artery:  Multifocal areas of anterior cerebral artery pericallosal artery stenosis particularly of the smaller left pericallosal artery with an area of severe stenosis just anterior to the anterior genu of the corpus callosum but multifocal disease. Of note there is a chronic substantial left ZOLTAN territory infarction in the distal left ZOLTAN is a very small vessel. Distal MCA M2/M3 segment:  There are some areas of distal MCA stenosis, multifocal consistent with additional areas of intracranial atherosclerosis. No other significant vascular abnormality.  Brain parenchyma on source data:  No new parenchymal brain abnormality other than what was previously noted on brain MRI with a substantial chronic superior medial left hemispheric ZOLTAN territory infarction and additional chronic small vessel infarctions. Impression: 1. No hemodynamically significant cervical vascular stenosis. 2. Evidence of intracranial atherosclerosis. Stenosis is most severe involving the left ZOLTAN likely associated with the large chronic left ZOLTAN territory infarction as well as inferior division right M2 segment MCA. Additional more distal MCA and ZOLTAN stenosis as described above. Less severe potentially moderate stenosis mid right M1 segment MCA. 3. 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 No results found for this or any previous visit.         CC: Eitan Parisi MD

## 2019-06-06 NOTE — PROGRESS NOTES
NEUROLOGY PROGRESS NOTE        Patient: Maria De Jesus Frausto        Sex: female          DOA: 5/29/2019  YOB: 1945      Age:  69 y.o.         LOS: 8 days     Identification:  [id number] female presents with altered mental status. SUBJECTIVE:   Patient was having visual hallucination yesterday. She was confused, she asked her daughter to leave this morning. Daughter stated that her mom started to have memory loss since foot surgery last summer. Her mental status has been waxing and waning since then. Sometimes, she stays up at night, does not gosleep. She has personality change, that she frequently curses family member at home. She also has urinary incontinence. She has combative behavior at home as well. After a fall recently, her mental status become worse , therefore the daughter who lives with her decided to send her to hospital.     REVIEW OF SYSTEMS: UTO 2/2 AMS    OBJECTIVE:      Visit Vitals  /79 (BP 1 Location: Left arm, BP Patient Position: At rest)   Pulse 75   Temp 97.9 °F (36.6 °C)   Resp 16   Ht 5' 2\" (1.575 m)   Wt 66.1 kg (145 lb 11.2 oz)   SpO2 100%   BMI 26.65 kg/m²     Physical Exam:  GENERAL: in no acute distress  HEENT: Moist mucous membranes, sclerae anicteric, scalp is atraumatic. CVS: Regular rate and rhythm, no murmurs or gallops. No carotid bruits. PULMONARY: Clear to auscultation bilaterally. No rales or rhonchi. No wheezing. EXTREMITIES: Normal range of motion at all sites. No deformities. ABDOMEN: Soft, nontender. SKIN: No rashes or ecchymoses. Warm and dry. NEUROLOGIC:She follows  simple very simple commands. Patient is disoriented, she said she never saw me before. She does not remember how many kids she has although she remembers her daughter at best side.  She answers no to every questions  Current Facility-Administered Medications   Medication Dose Route Frequency    QUEtiapine (SEROquel) tablet 50 mg  50 mg Oral QHS    polyethylene glycol (MIRALAX) packet 17 g  17 g Oral DAILY    multivitamin, tx-iron-ca-min (THERA-M w/ IRON) tablet 1 Tab  1 Tab Oral DAILY    ascorbic acid (vitamin C) (VITAMIN C) tablet 500 mg  500 mg Oral DAILY    melatonin tablet 5 mg  5 mg Oral QHS    atorvastatin (LIPITOR) tablet 20 mg  20 mg Oral DAILY    sodium chloride (NS) flush 5-40 mL  5-40 mL IntraVENous Q8H    sodium chloride (NS) flush 5-40 mL  5-40 mL IntraVENous PRN    ondansetron (ZOFRAN) injection 4 mg  4 mg IntraVENous Q6H PRN    aspirin chewable tablet 81 mg  81 mg Oral DAILY    acetaminophen (TYLENOL) tablet 650 mg  650 mg Oral Q4H PRN    enoxaparin (LOVENOX) injection 40 mg  40 mg SubCUTAneous Q24H       Laboratory  No results found for this or any previous visit (from the past 24 hour(s)). Radiology:  Mri Brain Wo Cont    Result Date: 5/29/2019  Brain MR without contrast: Indication: History of fall 3 days previous from wheelchair. Uncontrollable movements right upper extremity and decreased level of consciousness with increased confusion. Procedure: Sagittal spin echo T1, axial FSE FLAIR and T2 and axial diffusion weighted scanning was performed. An axial T2* scan optimized to detect hemosiderin and calcium was performed. Coronal spin echo T1and FSE T2 scans were also performed. No contrast was administered. Comparison exam: Head CT 5/29/2019. Findings: Diffusion: There are no areas of restricted diffusion, no acute infarction. The T2* scan shows no acute or chronic hemorrhage or abnormal calcifications. Brain parenchyma: [Substantial ex vacuo dilatation of the left lateral ventricle related to a likely chronic infarction in the superior medial left frontal lobe, an area of focal atrophy with substantial subcortical cystic encephalomalacia and some gliosis.  Additional areas of in part fluid signal likely chronic lacunar infarction anterior limb internal capsule on the left and in the right thalamus are noted.] Confluent ischemic changes immediate periventricular white matter. There is substantial diffuse atrophy of the body of the corpus callosum. Likely areas of chronic infarction of bilateral paramedian upper anterior genu and bilateral body of the corpus callosum. The rostrum and splenium are present and relatively intact. No mass or mass effect. There is a right paracentral fluid signal lesion in the mid maged likely an area of chronic lacunar infarction. Likely fluid signal chronic area of infarction in the immediate periventricular right parietal lobe in part communicating with the lateral ventricle. Ventricles and sulci: Mild prominence of sulci in the perisylvian region. The temporal tips are mildly prominent. Extra axial: No extra axial fluid collection or mass. Brain vasculature: Normal, no arterial vascular abnormality is noted. Craniocervical Junction: Normal. Extracranial and skull base:  Mucosal thickening in the paranasal sinuses. No air-fluid level. Impression: 1. No acute hemorrhage or acute infarction. No evidence of significant acute brain injury. 2. Substantial chronic infarction in superior medial left frontal lobe. Likely extensive chronic bilateral corpus callosum infarction. Chronic lacunar infarctions in the left anterior limb internal capsule, right thalamus, right parietal periventricular white matter and right paramedian maged. 3. Atrophy as described with ischemic white matter change. Ct Head Wo Cont    Result Date: 5/29/2019  EXAM: CT head INDICATION: Unwitnessed fall, increasing agitation. COMPARISON: None. TECHNIQUE: Axial CT imaging of the head was performed without intravenous contrast. One or more dose reduction techniques were used on this CT: automated exposure control, adjustment of the mAs and/or kVp according to patient size, and iterative reconstruction techniques. The specific techniques used on this CT exam have been documented in the patient's electronic medical record.   Digital Imaging and Communications in Medicine (DICOM) format image data are available to nonaffiliated external healthcare facilities or entities on a secure, media free, reciprocally searchable basis with patient authorization for at least a 12-month period after this study. _______________ FINDINGS: BRAIN AND POSTERIOR FOSSA: There is mild cortical and cerebellar volume loss present. There is ex vacuo dilatation of the left lateral ventricle related to adjacent areas of encephalomalacia within the left frontal lobe and left centrum semiovale basilar cisterns are patent. Subcortical and periventricular white matter low-attenuation is present. Bilateral physiologic basal ganglia calcifications. There is no intracranial hemorrhage, mass effect, or midline shift. Gray-white matter differentiation is within normal limits. EXTRA-AXIAL SPACES AND MENINGES: There are no abnormal extra-axial fluid collections. CALVARIUM: Intact. SINUSES: Imaged paranasal sinuses and mastoid air cells are clear. OTHER: None. _______________     IMPRESSION: 1. No acute intracranial abnormality. 2. Evidence of prior left frontal lobe infarcts with associated ex vacuo dilatation of the anterior horn of the left lateral ventricle. 3. Subcortical and periventricular white matter low-attenuation; favored to reflect sequela of chronic ischemic microvascular change. Cta Head Neck W Wo Cont    Addendum Date: 5/29/2019    Addendum: 4. Somewhat irregular small right upper lobe lung nodule. This could be an area of scarring. Chest CT for correlation is suggested. Result Date: 5/29/2019  Brain CT angiogram and Neck CT angiogram: Indication: Evaluate for stenosis. Possible infarction. Evaluate for source of embolus. History of recent fall with possible head injury. Uncontrolled subsequent movements of her right upper extremity and decreased level of consciousness with increased confusion.  Not certain as to the reason why brain and neck vascular imaging was not performed at the time of brain MRI performed earlier. Procedure: Neck CT angiogram: Contrast was administered with a power injector. Axial thin section volume acquisition  scans were obtained timed for peak arterial enhancement. An automated triggering system was used. Axial images were generated. Angiographic coronal and sagittal reformations were also generated. Extensive 3-D separate workstation processing was performed by Georgina Goodman. Brain CT angiogram: Dedicated slab MIP overlapping angiographic reconstructions in the sagittal, axial and coronal plane of the brain component of the axial evaluation were also performed. Extensive 3-D separate workstation processing was performed by Georgina Goodman. One or more dose reduction techniques were used on this CT: automated exposure control, adjustment of the mAs and/or kVp according to patient size, and iterative reconstruction techniques. The specific techniques used on this CT exam have been documented in the patient's electronic medical record. Digital Imaging and Communications in Medicine (DICOM) format image data are available to nonaffiliated external healthcare facilities or entities on a secure, media free, reciprocally searchable basis with patient authorization for at least a 12-month period after this study. Comparison exam: Head CT and brain MRI 5/29/2019. Findings: Neck CTA: Any internal carotid stenosis described below uses NASCET criteria, minimal residual lumen diameter versus the non-tapering cervical internal carotid artery. Aortic Arch: Normal branching pattern. No proximal great vessel stenosis. Some ulcerative changes along the aortic arch without large aneurysm or definitive high-grade stenosis. Left carotid: No stenosis or other vascular abnormality. Right carotid:  Mild calcified plaque carotid bulb but no substantial diameter stenosis or other vascular abnormality. The vertebral arteries are left dominant.  Right vertebral artery:  Is characterized by calcified subclavian plaque adjacent to the origin without significant stenosis of the right vertebral which is patent to the basilar. Left vertebral artery:  No stenosis or other vascular abnormality. Lung apices: There is a small nodule in the right lung apex measuring 3 mm with minimal linear density, possibly chronic scarring. On the coronal reformations the lesion has a potentially mildly spiculated appearance and measures 4 mm. No other suspicious lung nodule. Neck soft tissues: Lobulated prominence of the lingual tonsils and prominence of the palatine tonsils are symmetric likely reactive. No definite invasive mass or pathologic adenopathy with small likely reactive lymph nodes bilaterally. Brain CTA: Carotid siphon and supraclinoid internal carotid artery: There is some calcified plaque in the carotid siphon but no definitive high-grade siphon stenosis. Mild stenosis of the junction of the cavernous and supraclinoid internal carotid on the right, felt to be less than 50%. There is a suspicion of a small contour abnormality projecting anterior of the anterior aspect of the right anterior cavernous carotid loop projecting anterior and just lateral, clearly intracavernous but potentially a tiny aneurysm very wide neck measuring 2 mm. No other supraclinoid internal carotid artery aneurysm or stenosis. M1 segment and proximal M2 segment MCA:  Mild-moderate mid right M1 segment stenosis with some irregularity of contour but no definitive high-grade stenosis substantial stenosis origin inferior division right M2 segment, clearly greater than 50%. No left M1 or proximal M2 segment stenosis. . A1 segment, anterior communicating artery and proximal A2 segments:  No significant stenosis or aneurysm. Vertebrobasilar system:  Variant anatomy is elongated V4 segment right vertebral artery with a very distal vertebral basilar junction.  Dominant left vertebral. Fenestration proximal V4 segment right vertebral without associated aneurysm. Mild irregularity left P2 segment PCA. No definite high-grade PCA stenosis. Distal anterior cerebral artery:  Multifocal areas of anterior cerebral artery pericallosal artery stenosis particularly of the smaller left pericallosal artery with an area of severe stenosis just anterior to the anterior genu of the corpus callosum but multifocal disease. Of note there is a chronic substantial left ZOLTAN territory infarction in the distal left ZOLTAN is a very small vessel. Distal MCA M2/M3 segment:  There are some areas of distal MCA stenosis, multifocal consistent with additional areas of intracranial atherosclerosis. No other significant vascular abnormality. Brain parenchyma on source data:  No new parenchymal brain abnormality other than what was previously noted on brain MRI with a substantial chronic superior medial left hemispheric ZOLTAN territory infarction and additional chronic small vessel infarctions. Impression: 1. No hemodynamically significant cervical vascular stenosis. 2. Evidence of intracranial atherosclerosis. Stenosis is most severe involving the left ZOLTAN likely associated with the large chronic left ZOLTAN territory infarction as well as inferior division right M2 segment MCA. Additional more distal MCA and ZOLTAN stenosis as described above. Less severe potentially moderate stenosis mid right M1 segment MCA. 3. 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 ASSESSMENT/IMPRESSION:   79-year-old female with past medical history of left frontal lobe stroke, and hyperlipidemia, Memory loss and behavior change in the past 12 months, presents with acute worsening encephalopathy after a fall. Physical exam is unremarkable except that patient is disoriented, and confused.  CT head ruled out acute abnormality, EEG is severely contaminated by EMG artifacts, there may be some transient sharp waves. Brain MRI, B12, thyroid function,    1. Acute encephalopathy: Delirium. It is secondary to Alzheimer's disease with behavior change. Her mental status was improved in the past few days with antibiotic treatment for Possible UTI. Patient restarted to have visual hallucination, confusion yesterday and today. 2. History of left frontal stroke. 3. Alzheimer's disease with behavior disturbance. 4. Abnormal lab: neg RPR, positive T pallidium: false positive? Consult infectious disease? PLAN/RECOMMENDATIONS:  1. Continue seroquel 50 mg qhs for symptomatic treatment. Add Seroquel 25 mg QAM.  2. Haloperidol 0.5 mg as needed for behavior change. 3. Aricept 5 mg Q day  4. Frequent reorientation at best side. 5. Continue aspirin 81 mg daily and  Lipitor. Neurology will sign off. Please do not hesitate to return with any questions. Signed:   Glen Caldwell MD  6/6/2019  8:27 AM

## 2019-06-23 ENCOUNTER — HOME HEALTH ADMISSION (OUTPATIENT)
Dept: HOME HEALTH SERVICES | Facility: HOME HEALTH | Age: 74
End: 2019-06-23

## 2022-03-18 PROBLEM — R41.0 CONFUSION: Status: ACTIVE | Noted: 2019-05-29

## 2022-03-18 PROBLEM — G25.5 HEMIBALLISMUS: Status: ACTIVE | Noted: 2019-05-29

## 2022-03-18 PROBLEM — Z99.3 WHEELCHAIR BOUND: Status: ACTIVE | Noted: 2019-05-29

## 2022-03-19 PROBLEM — F03.90 DEMENTIA (HCC): Status: ACTIVE | Noted: 2019-06-06

## 2022-03-19 PROBLEM — Z86.73 HISTORY OF STROKE: Status: ACTIVE | Noted: 2019-05-29

## 2022-03-19 PROBLEM — I10 HYPERTENSION: Status: ACTIVE | Noted: 2019-05-29
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
## 2025-05-21 ENCOUNTER — HOSPITAL ENCOUNTER (EMERGENCY)
Facility: HOSPITAL | Age: 80
Discharge: HOME | End: 2025-05-24
Payer: MEDICAID

## 2025-05-21 ENCOUNTER — HOSPITAL ENCOUNTER (EMERGENCY)
Facility: HOSPITAL | Age: 80
Discharge: HOME | End: 2025-05-21
Payer: MEDICAID

## 2025-05-21 VITALS
SYSTOLIC BLOOD PRESSURE: 152 MMHG | HEART RATE: 73 BPM | RESPIRATION RATE: 20 BRPM | OXYGEN SATURATION: 99 % | WEIGHT: 135 LBS | DIASTOLIC BLOOD PRESSURE: 75 MMHG | TEMPERATURE: 97.5 F | BODY MASS INDEX: 24.84 KG/M2 | HEIGHT: 62 IN

## 2025-05-21 DIAGNOSIS — N30.00 ACUTE CYSTITIS WITHOUT HEMATURIA: ICD-10-CM

## 2025-05-21 DIAGNOSIS — S09.90XA CLOSED HEAD INJURY, INITIAL ENCOUNTER: Primary | ICD-10-CM
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
## 2025-05-21 DIAGNOSIS — S05.11XA PERIORBITAL CONTUSION OF RIGHT EYE, INITIAL ENCOUNTER: ICD-10-CM

## 2025-05-21 DIAGNOSIS — I69.30 HISTORY OF CEREBROVASCULAR ACCIDENT (CVA) WITH RESIDUAL DEFICIT: ICD-10-CM

## 2025-05-21 LAB
ALBUMIN SERPL-MCNC: 3.3 G/DL (ref 3.4–5)
ALBUMIN/GLOB SERPL: 0.9 (ref 0.8–1.7)
ALP SERPL-CCNC: 138 U/L (ref 45–117)
ALT SERPL-CCNC: 22 U/L (ref 10–35)
ANION GAP SERPL CALC-SCNC: 11 MMOL/L (ref 3–18)
APTT PPP: 28.3 SEC (ref 23–36.4)
AST SERPL-CCNC: 22 U/L (ref 10–38)
BILIRUB SERPL-MCNC: 0.4 MG/DL (ref 0.2–1)
BUN SERPL-MCNC: 35 MG/DL (ref 6–23)
BUN/CREAT SERPL: 25 (ref 12–20)
CALCIUM SERPL-MCNC: 8.8 MG/DL (ref 8.5–10.1)
CHLORIDE SERPL-SCNC: 103 MMOL/L (ref 98–107)
CO2 SERPL-SCNC: 25 MMOL/L (ref 21–32)
CREAT SERPL-MCNC: 1.38 MG/DL (ref 0.6–1.3)
GLOBULIN SER CALC-MCNC: 3.6 G/DL (ref 2–4)
GLUCOSE SERPL-MCNC: 108 MG/DL (ref 74–108)
INR PPP: 1 (ref 0.9–1.1)
POTASSIUM SERPL-SCNC: 4.8 MMOL/L (ref 3.5–5.5)
PROT SERPL-MCNC: 6.9 G/DL (ref 6.4–8.2)
PROTHROMBIN TIME: 13.8 SEC (ref 11.9–14.9)
SODIUM SERPL-SCNC: 139 MMOL/L (ref 136–145)

## 2025-05-21 PROCEDURE — 6370000000 HC RX 637 (ALT 250 FOR IP): Performed by: PHYSICIAN ASSISTANT

## 2025-05-21 PROCEDURE — 85730 THROMBOPLASTIN TIME PARTIAL: CPT

## 2025-05-21 PROCEDURE — 72125 CT NECK SPINE W/O DYE: CPT

## 2025-05-21 PROCEDURE — 70486 CT MAXILLOFACIAL W/O DYE: CPT

## 2025-05-21 PROCEDURE — 85025 COMPLETE CBC W/AUTO DIFF WBC: CPT

## 2025-05-21 PROCEDURE — 93005 ELECTROCARDIOGRAM TRACING: CPT | Performed by: PHYSICIAN ASSISTANT

## 2025-05-21 PROCEDURE — 2500000003 HC RX 250 WO HCPCS: Performed by: PHYSICIAN ASSISTANT

## 2025-05-21 PROCEDURE — 80053 COMPREHEN METABOLIC PANEL: CPT

## 2025-05-21 PROCEDURE — 81001 URINALYSIS AUTO W/SCOPE: CPT

## 2025-05-21 PROCEDURE — 99284 EMERGENCY DEPT VISIT MOD MDM: CPT

## 2025-05-21 PROCEDURE — 85610 PROTHROMBIN TIME: CPT

## 2025-05-21 PROCEDURE — 70450 CT HEAD/BRAIN W/O DYE: CPT

## 2025-05-21 RX ORDER — ACETAMINOPHEN 500 MG
1000 TABLET ORAL
Status: COMPLETED | OUTPATIENT
Start: 2025-05-21 | End: 2025-05-21

## 2025-05-21 RX ORDER — PROPARACAINE HYDROCHLORIDE 5 MG/ML
1 SOLUTION/ DROPS OPHTHALMIC
Status: COMPLETED | OUTPATIENT
Start: 2025-05-21 | End: 2025-05-21

## 2025-05-21 RX ADMIN — PROPARACAINE HYDROCHLORIDE 1 DROP: 5 SOLUTION/ DROPS OPHTHALMIC at 23:21

## 2025-05-21 RX ADMIN — FLUORESCEIN SODIUM 1 MG: 1 STRIP OPHTHALMIC at 23:21

## 2025-05-21 RX ADMIN — ACETAMINOPHEN 1000 MG: 500 TABLET ORAL at 21:52

## 2025-05-22 LAB
APPEARANCE UR: CLEAR
BACTERIA URNS QL MICRO: ABNORMAL /HPF
BASOPHILS # BLD: 0 K/UL (ref 0–0.1)
BASOPHILS NFR BLD: 0 % (ref 0–2)
BILIRUB UR QL: NEGATIVE
COLOR UR: YELLOW
DIFFERENTIAL METHOD BLD: ABNORMAL
EKG ATRIAL RATE: 71 BPM
EKG DIAGNOSIS: NORMAL
EKG P AXIS: 90 DEGREES
EKG P-R INTERVAL: 166 MS
EKG Q-T INTERVAL: 410 MS
EKG QRS DURATION: 116 MS
EKG QTC CALCULATION (BAZETT): 445 MS
EKG R AXIS: 36 DEGREES
EKG T AXIS: 52 DEGREES
EKG VENTRICULAR RATE: 71 BPM
EOSINOPHIL # BLD: 4.46 K/UL (ref 0–0.4)
EOSINOPHIL NFR BLD: 36 % (ref 0–5)
EPITH CASTS URNS QL MICRO: ABNORMAL /LPF (ref 0–5)
ERYTHROCYTE [DISTWIDTH] IN BLOOD BY AUTOMATED COUNT: 11.8 % (ref 11.6–14.5)
GLUCOSE UR STRIP.AUTO-MCNC: NEGATIVE MG/DL
HCT VFR BLD AUTO: 33 % (ref 35–45)
HGB BLD-MCNC: 10.8 G/DL (ref 12–16)
HGB UR QL STRIP: NEGATIVE
IMM GRANULOCYTES # BLD AUTO: 0 K/UL
IMM GRANULOCYTES NFR BLD AUTO: 0 %
KETONES UR QL STRIP.AUTO: NEGATIVE MG/DL
LEUKOCYTE ESTERASE UR QL STRIP.AUTO: ABNORMAL
LYMPHOCYTES # BLD: 2.73 K/UL (ref 0.9–3.6)
LYMPHOCYTES NFR BLD: 22 % (ref 21–52)
MCH RBC QN AUTO: 31.9 PG (ref 24–34)
MCHC RBC AUTO-ENTMCNC: 32.7 G/DL (ref 31–37)
MCV RBC AUTO: 97.3 FL (ref 78–100)
MONOCYTES # BLD: 0 K/UL (ref 0.05–1.2)
MONOCYTES NFR BLD: 0 % (ref 3–10)
NEUTS SEG # BLD: 5.21 K/UL (ref 1.8–8)
NEUTS SEG NFR BLD: 42 % (ref 40–73)
NITRITE UR QL STRIP.AUTO: POSITIVE
NRBC # BLD: 0 K/UL (ref 0–0.01)
NRBC BLD-RTO: 0 PER 100 WBC
PERIPHERAL SMEAR, MD REVIEW: NORMAL
PH UR STRIP: 6.5 (ref 5–8)
PLATELET # BLD AUTO: 203 K/UL (ref 135–420)
PLATELET COMMENT: ABNORMAL
PMV BLD AUTO: 10.7 FL (ref 9.2–11.8)
PROT UR STRIP-MCNC: 30 MG/DL
RBC # BLD AUTO: 3.39 M/UL (ref 4.2–5.3)
RBC #/AREA URNS HPF: ABNORMAL /HPF (ref 0–5)
RBC MORPH BLD: ABNORMAL
SP GR UR REFRACTOMETRY: 1.01 (ref 1–1.03)
UROBILINOGEN UR QL STRIP.AUTO: 0.2 EU/DL (ref 0.2–1)
WBC # BLD AUTO: 12.4 K/UL (ref 4.6–13.2)
WBC URNS QL MICRO: ABNORMAL /HPF (ref 0–5)

## 2025-05-22 NOTE — ED TRIAGE NOTES
Patient to ED with use of wheelchair for reported fall at home with head injury. Daughter reports that patient was attempted to stand up when she fell forward, striking the front of her head on the ground. Patient has large hematoma with marked swelling to right eye

## 2025-05-22 NOTE — ED NOTES
Pt cleared for discharge. Discharge instructions reviewed with the daughter. Patient assisted into wheelchair. Pt out of ED.

## 2025-05-22 NOTE — ED PROVIDER NOTES
[facility]  EMERGENCY DEPARTMENT ENCOUNTER       Pt Name: Dameon Martinez  MRN: [id number]  Birthdate 1945  Date of evaluation: 5/21/2025  PCP: Stephanie Gómez MD  Note Started: 10:19 AM 5/21/25     CHIEF COMPLAINT       Chief Complaint   Patient presents with    Head Injury     Sent to ED from [facility] for c/o head injury at 1600 today. Pt fell from wheelchair hitting face. No LOC. No blood thinner use. Pt has hx of dementia. With daughter.  notes no FND and at baseline. Sent for CT    Fall        HISTORY OF PRESENT ILLNESS: 1 or more elements      History From: Patient's Daughter  HPI Limitations: Dementia  Chronic Conditions: CVA with right sided residual deficits, HLP, wheelchair bound, dementia  Social Determinants affecting Dx or Tx: none      Dameon Martinez is a 80 y.o. female who presents to ED c/o fall. Daughter notes at 1600 today, pt was in wheelchair waiting to be transferred to toilet when pt attempted to rise on her own causing her to fall forward striking right side of face on floor. Daughter witnesses fall and notes no LOC. She was able to place pt back in wheelchair with family members help. She was placed in the bed. Daughter notes shortly after she noticed swelling around right eye which is now swollen shut. Pt is wheelchair bound secondary to CVA with residual right sided weakness. Pt is at baseline per daughter. Pt was seen at  and sent to ED for CT scan. No blood thinner use. No vomiting. Pt was able to eat after fall.      Nursing Notes were all reviewed and agreed with or any disagreements were addressed in the HPI.    PAST HISTORY     Past Medical History:  Past Medical History:   Diagnosis Date    Hyperlipemia     Stroke (HCC)        Past Surgical History:  Past Surgical History:   Procedure Laterality Date    HIP ARTHROSCOPY      right hip    XR MIDLINE EQUAL OR GREATER THAN 5 YEARS  7/6/2018    XR MIDLINE EQUAL OR GREATER THAN 5 YEARS 7/6/2018    XR MIDLINE

## 2025-05-22 NOTE — DISCHARGE INSTRUCTIONS
Ice packs to right eye 2-3 times per day for 10-15 minutes  Give Tylenol, two 500 mg tablets up to 4 times a day for pain  Follow up with family doctor  Follow up with eye specialist.

## 2025-05-24 NOTE — ED NOTES
10:12 AM EDT   5/24/25     + nitrites on UA with 4+ bacteria on urine micro. Pt seen for mechanical fall. Attempted to call daughter to discuss need for ABX for UTI. Number disconnected. No other contact number. Will send certified letter. Give to [employer] to send out.      Feliz Cabrera PA-C  05/24/25 0611